# Patient Record
Sex: MALE | Race: WHITE | NOT HISPANIC OR LATINO | Employment: FULL TIME | URBAN - METROPOLITAN AREA
[De-identification: names, ages, dates, MRNs, and addresses within clinical notes are randomized per-mention and may not be internally consistent; named-entity substitution may affect disease eponyms.]

---

## 2023-04-07 LAB
ALANINE AMINOTRANSFERASE (SGPT) (U/L) IN SER/PLAS: 15 U/L (ref 10–52)
ALBUMIN (G/DL) IN SER/PLAS: 3.7 G/DL (ref 3.4–5)
ALKALINE PHOSPHATASE (U/L) IN SER/PLAS: 61 U/L (ref 33–136)
ANION GAP IN SER/PLAS: 13 MMOL/L (ref 10–20)
ASPARTATE AMINOTRANSFERASE (SGOT) (U/L) IN SER/PLAS: 17 U/L (ref 9–39)
BASOPHILS (10*3/UL) IN BLOOD BY AUTOMATED COUNT: 0.03 X10E9/L (ref 0–0.1)
BASOPHILS/100 LEUKOCYTES IN BLOOD BY AUTOMATED COUNT: 0.4 % (ref 0–2)
BILIRUBIN TOTAL (MG/DL) IN SER/PLAS: 0.4 MG/DL (ref 0–1.2)
C REACTIVE PROTEIN (MG/L) IN SER/PLAS: 0.14 MG/DL
CALCIUM (MG/DL) IN SER/PLAS: 9 MG/DL (ref 8.6–10.6)
CARBON DIOXIDE, TOTAL (MMOL/L) IN SER/PLAS: 26 MMOL/L (ref 21–32)
CHLORIDE (MMOL/L) IN SER/PLAS: 107 MMOL/L (ref 98–107)
CREATININE (MG/DL) IN SER/PLAS: 0.86 MG/DL (ref 0.5–1.3)
EOSINOPHILS (10*3/UL) IN BLOOD BY AUTOMATED COUNT: 0.25 X10E9/L (ref 0–0.7)
EOSINOPHILS/100 LEUKOCYTES IN BLOOD BY AUTOMATED COUNT: 3.3 % (ref 0–6)
ERYTHROCYTE DISTRIBUTION WIDTH (RATIO) BY AUTOMATED COUNT: 13.8 % (ref 11.5–14.5)
ERYTHROCYTE MEAN CORPUSCULAR HEMOGLOBIN CONCENTRATION (G/DL) BY AUTOMATED: 33.2 G/DL (ref 32–36)
ERYTHROCYTE MEAN CORPUSCULAR VOLUME (FL) BY AUTOMATED COUNT: 95 FL (ref 80–100)
ERYTHROCYTES (10*6/UL) IN BLOOD BY AUTOMATED COUNT: 4.18 X10E12/L (ref 4.5–5.9)
GFR MALE: >90 ML/MIN/1.73M2
GLUCOSE (MG/DL) IN SER/PLAS: 84 MG/DL (ref 74–99)
HEMATOCRIT (%) IN BLOOD BY AUTOMATED COUNT: 39.7 % (ref 41–52)
HEMOGLOBIN (G/DL) IN BLOOD: 13.2 G/DL (ref 13.5–17.5)
IMMATURE GRANULOCYTES/100 LEUKOCYTES IN BLOOD BY AUTOMATED COUNT: 0.3 % (ref 0–0.9)
LEUKOCYTES (10*3/UL) IN BLOOD BY AUTOMATED COUNT: 7.5 X10E9/L (ref 4.4–11.3)
LYMPHOCYTES (10*3/UL) IN BLOOD BY AUTOMATED COUNT: 1.56 X10E9/L (ref 1.2–4.8)
LYMPHOCYTES/100 LEUKOCYTES IN BLOOD BY AUTOMATED COUNT: 20.9 % (ref 13–44)
MONOCYTES (10*3/UL) IN BLOOD BY AUTOMATED COUNT: 0.22 X10E9/L (ref 0.1–1)
MONOCYTES/100 LEUKOCYTES IN BLOOD BY AUTOMATED COUNT: 2.9 % (ref 2–10)
NEUTROPHILS (10*3/UL) IN BLOOD BY AUTOMATED COUNT: 5.4 X10E9/L (ref 1.2–7.7)
NEUTROPHILS/100 LEUKOCYTES IN BLOOD BY AUTOMATED COUNT: 72.2 % (ref 40–80)
NRBC (PER 100 WBCS) BY AUTOMATED COUNT: 0 /100 WBC (ref 0–0)
PLATELETS (10*3/UL) IN BLOOD AUTOMATED COUNT: 290 X10E9/L (ref 150–450)
POTASSIUM (MMOL/L) IN SER/PLAS: 4.6 MMOL/L (ref 3.5–5.3)
PROTEIN TOTAL: 6.1 G/DL (ref 6.4–8.2)
SEDIMENTATION RATE, ERYTHROCYTE: 5 MM/H (ref 0–20)
SODIUM (MMOL/L) IN SER/PLAS: 141 MMOL/L (ref 136–145)
UREA NITROGEN (MG/DL) IN SER/PLAS: 17 MG/DL (ref 6–23)

## 2023-12-06 PROBLEM — M25.559 JOINT PAIN, HIP: Status: ACTIVE | Noted: 2023-12-06

## 2023-12-06 PROBLEM — M54.10 RADICULOPATHY: Status: ACTIVE | Noted: 2023-12-06

## 2023-12-06 PROBLEM — I34.1 MITRAL VALVE PROLAPSE SYNDROME: Status: ACTIVE | Noted: 2023-12-06

## 2023-12-06 PROBLEM — I49.3 PREMATURE VENTRICULAR CONTRACTIONS: Status: ACTIVE | Noted: 2023-12-06

## 2023-12-06 PROBLEM — K58.9 IBS (IRRITABLE BOWEL SYNDROME): Status: ACTIVE | Noted: 2023-12-06

## 2023-12-06 PROBLEM — M54.59 MECHANICAL LOW BACK PAIN: Status: ACTIVE | Noted: 2023-12-06

## 2023-12-06 PROBLEM — M05.9 SEROPOSITIVE RHEUMATOID ARTHRITIS (MULTI): Status: ACTIVE | Noted: 2023-12-06

## 2023-12-06 PROBLEM — I73.00 RAYNAUDS PHENOMENON: Status: ACTIVE | Noted: 2023-12-06

## 2023-12-06 PROBLEM — M89.8X1 PAIN OF RIGHT SCAPULA: Status: ACTIVE | Noted: 2023-12-06

## 2023-12-06 PROBLEM — M19.049 ARTHROPATHY OF HAND: Status: ACTIVE | Noted: 2023-12-06

## 2023-12-06 PROBLEM — M75.21 BICEPS TENDINITIS OF RIGHT UPPER EXTREMITY: Status: ACTIVE | Noted: 2023-12-06

## 2023-12-06 PROBLEM — E55.9 VITAMIN D DEFICIENCY: Status: ACTIVE | Noted: 2023-12-06

## 2023-12-06 RX ORDER — DESOXIMETASONE 2.5 MG/G
CREAM TOPICAL
COMMUNITY
Start: 2015-09-23

## 2023-12-06 RX ORDER — SILDENAFIL 50 MG/1
TABLET, FILM COATED ORAL
COMMUNITY
Start: 2013-09-03

## 2023-12-06 RX ORDER — CYCLOBENZAPRINE HCL 10 MG
1 TABLET ORAL 3 TIMES DAILY PRN
COMMUNITY
Start: 2019-06-18

## 2023-12-06 RX ORDER — LEVOTHYROXINE SODIUM 25 UG/1
1 TABLET ORAL DAILY
COMMUNITY
Start: 2021-09-21

## 2023-12-06 RX ORDER — ACYCLOVIR 200 MG/1
200 CAPSULE ORAL EVERY 12 HOURS
COMMUNITY
Start: 2013-09-03

## 2023-12-06 RX ORDER — FOLIC ACID 1 MG/1
1 TABLET ORAL DAILY
COMMUNITY
Start: 2013-09-03

## 2023-12-06 RX ORDER — ACETAMINOPHEN 500 MG
500 TABLET ORAL EVERY 4 HOURS PRN
COMMUNITY
Start: 2019-06-10

## 2023-12-06 RX ORDER — LIDOCAINE 50 MG/G
PATCH TOPICAL
COMMUNITY
Start: 2015-12-02

## 2023-12-06 RX ORDER — HYDROXYCHLOROQUINE SULFATE 200 MG/1
200 TABLET, FILM COATED ORAL 2 TIMES DAILY
COMMUNITY
Start: 2013-09-03 | End: 2023-12-07 | Stop reason: SDUPTHER

## 2023-12-06 RX ORDER — TAMSULOSIN HYDROCHLORIDE 0.4 MG/1
CAPSULE ORAL
COMMUNITY
Start: 2019-10-16

## 2023-12-06 RX ORDER — METHOTREXATE 2.5 MG/1
12.5 TABLET ORAL
COMMUNITY
Start: 2013-09-03 | End: 2023-12-07 | Stop reason: SDUPTHER

## 2023-12-06 RX ORDER — GLUC/MSM/COLGN2/HYAL/ANTIARTH3 375-375-20
1 TABLET ORAL DAILY
COMMUNITY
Start: 2019-05-01

## 2023-12-06 RX ORDER — NAPROXEN 375 MG/1
TABLET ORAL EVERY 12 HOURS
COMMUNITY
Start: 2020-10-01

## 2023-12-07 ENCOUNTER — LAB (OUTPATIENT)
Dept: LAB | Facility: LAB | Age: 73
End: 2023-12-07
Payer: MEDICARE

## 2023-12-07 ENCOUNTER — OFFICE VISIT (OUTPATIENT)
Dept: RHEUMATOLOGY | Facility: CLINIC | Age: 73
End: 2023-12-07
Payer: MEDICARE

## 2023-12-07 VITALS
BODY MASS INDEX: 25.45 KG/M2 | DIASTOLIC BLOOD PRESSURE: 74 MMHG | HEIGHT: 73 IN | HEART RATE: 94 BPM | SYSTOLIC BLOOD PRESSURE: 139 MMHG | WEIGHT: 192 LBS

## 2023-12-07 DIAGNOSIS — M05.9 SEROPOSITIVE RHEUMATOID ARTHRITIS (MULTI): ICD-10-CM

## 2023-12-07 DIAGNOSIS — M05.9 SEROPOSITIVE RHEUMATOID ARTHRITIS (MULTI): Primary | ICD-10-CM

## 2023-12-07 LAB
ALBUMIN SERPL BCP-MCNC: 4.2 G/DL (ref 3.4–5)
ALP SERPL-CCNC: 73 U/L (ref 33–136)
ALT SERPL W P-5'-P-CCNC: 21 U/L (ref 10–52)
ANION GAP SERPL CALC-SCNC: 13 MMOL/L (ref 10–20)
AST SERPL W P-5'-P-CCNC: 20 U/L (ref 9–39)
BASOPHILS # BLD AUTO: 0.01 X10*3/UL (ref 0–0.1)
BASOPHILS NFR BLD AUTO: 0.1 %
BILIRUB SERPL-MCNC: 0.5 MG/DL (ref 0–1.2)
BUN SERPL-MCNC: 20 MG/DL (ref 6–23)
CALCIUM SERPL-MCNC: 9.2 MG/DL (ref 8.6–10.6)
CHLORIDE SERPL-SCNC: 106 MMOL/L (ref 98–107)
CO2 SERPL-SCNC: 27 MMOL/L (ref 21–32)
CREAT SERPL-MCNC: 1.04 MG/DL (ref 0.5–1.3)
CRP SERPL-MCNC: 0.22 MG/DL
EOSINOPHIL # BLD AUTO: 0.31 X10*3/UL (ref 0–0.4)
EOSINOPHIL NFR BLD AUTO: 3 %
ERYTHROCYTE [DISTWIDTH] IN BLOOD BY AUTOMATED COUNT: 13.2 % (ref 11.5–14.5)
ERYTHROCYTE [SEDIMENTATION RATE] IN BLOOD BY WESTERGREN METHOD: 4 MM/H (ref 0–20)
GFR SERPL CREATININE-BSD FRML MDRD: 77 ML/MIN/1.73M*2
GLUCOSE SERPL-MCNC: 96 MG/DL (ref 74–99)
HCT VFR BLD AUTO: 46.3 % (ref 41–52)
HGB BLD-MCNC: 15.4 G/DL (ref 13.5–17.5)
IMM GRANULOCYTES # BLD AUTO: 0.04 X10*3/UL (ref 0–0.5)
IMM GRANULOCYTES NFR BLD AUTO: 0.4 % (ref 0–0.9)
LYMPHOCYTES # BLD AUTO: 1.55 X10*3/UL (ref 0.8–3)
LYMPHOCYTES NFR BLD AUTO: 15.2 %
MCH RBC QN AUTO: 31.4 PG (ref 26–34)
MCHC RBC AUTO-ENTMCNC: 33.3 G/DL (ref 32–36)
MCV RBC AUTO: 95 FL (ref 80–100)
MONOCYTES # BLD AUTO: 0.26 X10*3/UL (ref 0.05–0.8)
MONOCYTES NFR BLD AUTO: 2.6 %
NEUTROPHILS # BLD AUTO: 8.01 X10*3/UL (ref 1.6–5.5)
NEUTROPHILS NFR BLD AUTO: 78.7 %
NRBC BLD-RTO: 0 /100 WBCS (ref 0–0)
PLATELET # BLD AUTO: 301 X10*3/UL (ref 150–450)
POTASSIUM SERPL-SCNC: 4.6 MMOL/L (ref 3.5–5.3)
PROT SERPL-MCNC: 6.9 G/DL (ref 6.4–8.2)
RBC # BLD AUTO: 4.9 X10*6/UL (ref 4.5–5.9)
SODIUM SERPL-SCNC: 141 MMOL/L (ref 136–145)
WBC # BLD AUTO: 10.2 X10*3/UL (ref 4.4–11.3)

## 2023-12-07 PROCEDURE — 80053 COMPREHEN METABOLIC PANEL: CPT

## 2023-12-07 PROCEDURE — 36415 COLL VENOUS BLD VENIPUNCTURE: CPT

## 2023-12-07 PROCEDURE — 99214 OFFICE O/P EST MOD 30 MIN: CPT | Performed by: INTERNAL MEDICINE

## 2023-12-07 PROCEDURE — 86140 C-REACTIVE PROTEIN: CPT

## 2023-12-07 PROCEDURE — 85025 COMPLETE CBC W/AUTO DIFF WBC: CPT

## 2023-12-07 PROCEDURE — 85652 RBC SED RATE AUTOMATED: CPT

## 2023-12-07 PROCEDURE — 1159F MED LIST DOCD IN RCRD: CPT | Performed by: INTERNAL MEDICINE

## 2023-12-07 RX ORDER — KETOCONAZOLE 20 MG/G
CREAM TOPICAL
COMMUNITY
Start: 2023-05-14

## 2023-12-07 RX ORDER — TRIAMCINOLONE ACETONIDE 1 MG/G
CREAM TOPICAL
COMMUNITY
Start: 2023-04-20

## 2023-12-07 RX ORDER — IBUPROFEN 600 MG/1
600 TABLET ORAL 2 TIMES DAILY PRN
COMMUNITY

## 2023-12-07 RX ORDER — PREDNISONE 5 MG/1
TABLET ORAL
COMMUNITY
Start: 2023-04-06 | End: 2024-03-21 | Stop reason: ALTCHOICE

## 2023-12-07 RX ORDER — HYDROXYCHLOROQUINE SULFATE 200 MG/1
300 TABLET, FILM COATED ORAL DAILY
Qty: 135 TABLET | Refills: 1 | Status: SHIPPED | OUTPATIENT
Start: 2023-12-07 | End: 2024-05-07

## 2023-12-07 RX ORDER — METHOTREXATE 2.5 MG/1
12.5 TABLET ORAL
Qty: 60 TABLET | Refills: 0 | Status: SHIPPED | OUTPATIENT
Start: 2023-12-07 | End: 2024-03-06

## 2023-12-07 NOTE — PROGRESS NOTES
Follow-up Rheumatology Patient Visit    Chief Complaint:  Charles C Shoenberger is a 73 y.o. male presenting today for Follow-up (refills).    History of Presenting Problem:   74 y/o male with RA present for evaluation. He is currently on MTX 5 pills weekly with folic acid  mg daily He weaned of chronic steroids and is now doing much better mood wise per patient..  Hx of COVID December 2020.   Today he report no acute joint complaints. He denies any new issues with his Lungs. He has been very active with no limitations in his joints     Problem List:   Patient Active Problem List   Diagnosis    Arthropathy of hand    Vitamin D deficiency    Seropositive rheumatoid arthritis (CMS/HCC)    Raynauds phenomenon    Radiculopathy    Premature ventricular contractions    Pain of right scapula    Mitral valve prolapse syndrome    Mechanical low back pain    Joint pain, hip    IBS (irritable bowel syndrome)    Biceps tendinitis of right upper extremity       Past Medical History:   Past Medical History:   Diagnosis Date    Bicipital tendinitis, right shoulder 05/26/2020    Biceps tendinitis of right upper extremity    Bunion of right foot 10/04/2016    Bunion of great toe of right foot    Calculus of kidney     Calcium kidney stones    Personal history of other diseases of the circulatory system 05/01/2019    History of cardiomyopathy    Personal history of other diseases of the musculoskeletal system and connective tissue 03/04/2014    History of joint pain    Personal history of other diseases of the nervous system and sense organs 12/02/2015    History of carpal tunnel syndrome    Personal history of other diseases of the respiratory system 03/17/2017    History of acute bronchitis    Personal history of other diseases of the respiratory system 02/19/2020    History of acute sinusitis    Personal history of other diseases of the respiratory system 11/03/2015    History of acute sinusitis    Personal history of other  diseases of the respiratory system 11/03/2015    History of acute bronchitis    Pneumonia, unspecified organism 03/27/2019    Community acquired pneumonia    Radiculopathy, cervical region 03/03/2020    Cervical radiculopathy    Right lower quadrant pain 03/13/2014    Abdominal pain, RLQ (right lower quadrant)    Sensorineural hearing loss, bilateral 02/07/2017    Bilateral sensorineural hearing loss    Strain of muscle, fascia and tendon of lower back, initial encounter 06/18/2019    Back strain, initial encounter       Surgical History:   Past Surgical History:   Procedure Laterality Date    APPENDECTOMY  09/03/2013    Appendectomy    COLONOSCOPY  02/07/2017    Complete Colonoscopy    HERNIA REPAIR  09/03/2013    Inguinal Hernia Repair        Allergies:   Allergies   Allergen Reactions    Lisinopril Cough       Medications:   Current Outpatient Medications:     acetaminophen (Tylenol Extra Strength) 500 mg tablet, Take 1 tablet (500 mg) by mouth every 4 hours if needed., Disp: , Rfl:     acyclovir (Zovirax) 200 mg capsule, Take 1 capsule (200 mg) by mouth every 12 hours., Disp: , Rfl:     cyclobenzaprine (Flexeril) 10 mg tablet, Take 1 tablet (10 mg) by mouth 3 times a day as needed., Disp: , Rfl:     desoximetasone (Topicort) 0.25 % cream, APPLY TWICE DAILY AS NEEDED TO AFFECTED AREA, Disp: , Rfl:     diclofenac sodium 1 % kit, Place 2 g on the skin 3 times a day., Disp: , Rfl:     folic acid (Folvite) 1 mg tablet, Take 1 tablet (1 mg) by mouth once daily., Disp: , Rfl:     ibuprofen 600 mg tablet, Take 1 tablet (600 mg) by mouth 2 times a day as needed., Disp: , Rfl:     ketoconazole (NIZOral) 2 % cream, , Disp: , Rfl:     levothyroxine (Synthroid, Levoxyl) 25 mcg tablet, Take 1 tablet (25 mcg) by mouth once daily., Disp: , Rfl:     lidocaine (Lidoderm) 5 % patch, USE AS DIRECTED ON PACKAGE, Disp: , Rfl:     mv-min-folic-lycop-lut-herb178 (Nate Multivitamin For Men) 200-175-250 mcg tablet, Take 1 tablet by mouth  "once daily., Disp: , Rfl:     naproxen (Naprosyn) 375 mg tablet, Take by mouth every 12 hours., Disp: , Rfl:     predniSONE (Deltasone) 5 mg tablet, , Disp: , Rfl:     sildenafil (Viagra) 50 mg tablet, Take by mouth., Disp: , Rfl:     tamsulosin (Flomax) 0.4 mg 24 hr capsule, Take by mouth once daily., Disp: , Rfl:     triamcinolone (Kenalog) 0.1 % cream, APPLY TOPICALLY TO AFFECTED AREAS ON FINGER AND LEFT WRIST ONCE DAILY AND COVER WITH PAPER TAPE, Disp: , Rfl:     hydroxychloroquine (Plaquenil) 200 mg tablet, Take 1.5 tablets (300 mg) by mouth once daily., Disp: 135 tablet, Rfl: 1    methotrexate (Trexall) 2.5 mg tablet, Take 5 tablets (12.5 mg total) by mouth 1 (one) time per week., Disp: 60 tablet, Rfl: 0      Objective   Physical Examination:    Visit Vitals  /74   Pulse 94   Ht 1.854 m (6' 1\")   Wt 87.1 kg (192 lb)   BMI 25.33 kg/m²   BSA 2.12 m²        Gen: NAD, A&O x 3  HEENT: clear sclera and conjunctiva,     Musculoskeletal:   Neck; WNL, full ROM  Shoulder: WNL, full ROM  Elbow:WNL, full ROM, no effusion noted  Wrist and fingers;no active synovitis noted, Full ROM in the Wrist , Good fist and   Knees:  No effusions or crepitation, full ROM.  Hips; WNL, full ROM, Negative Jae test  Ankle, Feet; WNL, full ROM    Skin: No rashes or lesions seen, no nail changes  Neuro: A&O x3, Normal Gait    Procedures :None    Orders:  Orders Placed This Encounter   Procedures    CBC and Auto Differential    Comprehensive Metabolic Panel    C-Reactive Protein    Sedimentation Rate        Provider Impression:   Assessment/Plan   Encounter Diagnosis   Name Primary?    Seropositive rheumatoid arthritis (CMS/HCC) Yes          72 y/o male with Seronegative RA present for evaluation.  -Continue methotrexate to 5 pills weekly, with daily folic acid   -Continue Plaquenil 200 mg BID, follow-up with ophthalmology for yearly eye screening  -Can use diclofenac gel for his fingers as needed  -We will obtain routine labs, " he did not do last orders  - F/u in 4 months

## 2024-03-21 ENCOUNTER — OFFICE VISIT (OUTPATIENT)
Dept: RHEUMATOLOGY | Facility: CLINIC | Age: 74
End: 2024-03-21
Payer: MEDICARE

## 2024-03-21 VITALS
HEART RATE: 80 BPM | HEIGHT: 73 IN | SYSTOLIC BLOOD PRESSURE: 124 MMHG | DIASTOLIC BLOOD PRESSURE: 83 MMHG | WEIGHT: 200.1 LBS | BODY MASS INDEX: 26.52 KG/M2

## 2024-03-21 DIAGNOSIS — M05.9 SEROPOSITIVE RHEUMATOID ARTHRITIS (MULTI): Primary | ICD-10-CM

## 2024-03-21 DIAGNOSIS — E55.9 VITAMIN D DEFICIENCY: ICD-10-CM

## 2024-03-21 PROCEDURE — 1159F MED LIST DOCD IN RCRD: CPT | Performed by: INTERNAL MEDICINE

## 2024-03-21 PROCEDURE — 99214 OFFICE O/P EST MOD 30 MIN: CPT | Performed by: INTERNAL MEDICINE

## 2024-03-21 RX ORDER — PREDNISONE 5 MG/1
TABLET ORAL
Qty: 30 TABLET | Refills: 0 | Status: SHIPPED | OUTPATIENT
Start: 2024-03-21 | End: 2024-04-02

## 2024-03-21 NOTE — PROGRESS NOTES
Follow-up Rheumatology Patient Visit    Chief Complaint:  Charles C Shoenberger is a 73 y.o. male presenting today for Follow-up.    History of Presenting Problem:   72 y/o male with RA present for evaluation. He is currently on MTX 5 pills weekly with folic acid  mg daily He weaned of chronic steroids and is now doing much better mood wise per patient..  Hx of COVID December 2020.   Today he report generalized joints worse in his knee and hands. He report it started in December 2023. It has been going up and down, he report prolonged morning stiffness, trouble sleep at bedtime     Problem List:   Patient Active Problem List   Diagnosis    Arthropathy of hand    Vitamin D deficiency    Seropositive rheumatoid arthritis (CMS/HCC)    Raynauds phenomenon    Radiculopathy    Premature ventricular contractions    Pain of right scapula    Mitral valve prolapse syndrome    Mechanical low back pain    Joint pain, hip    IBS (irritable bowel syndrome)    Biceps tendinitis of right upper extremity       Past Medical History:   Past Medical History:   Diagnosis Date    Bicipital tendinitis, right shoulder 05/26/2020    Biceps tendinitis of right upper extremity    Bunion of right foot 10/04/2016    Bunion of great toe of right foot    Calculus of kidney     Calcium kidney stones    Personal history of other diseases of the circulatory system 05/01/2019    History of cardiomyopathy    Personal history of other diseases of the musculoskeletal system and connective tissue 03/04/2014    History of joint pain    Personal history of other diseases of the nervous system and sense organs 12/02/2015    History of carpal tunnel syndrome    Personal history of other diseases of the respiratory system 03/17/2017    History of acute bronchitis    Personal history of other diseases of the respiratory system 02/19/2020    History of acute sinusitis    Personal history of other diseases of the respiratory system 11/03/2015    History  of acute sinusitis    Personal history of other diseases of the respiratory system 11/03/2015    History of acute bronchitis    Pneumonia, unspecified organism 03/27/2019    Community acquired pneumonia    Radiculopathy, cervical region 03/03/2020    Cervical radiculopathy    Right lower quadrant pain 03/13/2014    Abdominal pain, RLQ (right lower quadrant)    Sensorineural hearing loss, bilateral 02/07/2017    Bilateral sensorineural hearing loss    Strain of muscle, fascia and tendon of lower back, initial encounter 06/18/2019    Back strain, initial encounter       Surgical History:   Past Surgical History:   Procedure Laterality Date    APPENDECTOMY  09/03/2013    Appendectomy    COLONOSCOPY  02/07/2017    Complete Colonoscopy    HERNIA REPAIR  09/03/2013    Inguinal Hernia Repair        Allergies:   Allergies   Allergen Reactions    Lisinopril Cough       Medications:   Current Outpatient Medications:     acetaminophen (Tylenol Extra Strength) 500 mg tablet, Take 1 tablet (500 mg) by mouth every 4 hours if needed., Disp: , Rfl:     acyclovir (Zovirax) 200 mg capsule, Take 1 capsule (200 mg) by mouth every 12 hours., Disp: , Rfl:     cyclobenzaprine (Flexeril) 10 mg tablet, Take 1 tablet (10 mg) by mouth 3 times a day as needed., Disp: , Rfl:     desoximetasone (Topicort) 0.25 % cream, APPLY TWICE DAILY AS NEEDED TO AFFECTED AREA, Disp: , Rfl:     diclofenac sodium 1 % kit, Place 2 g on the skin 3 times a day., Disp: , Rfl:     folic acid (Folvite) 1 mg tablet, Take 1 tablet (1 mg) by mouth once daily., Disp: , Rfl:     ibuprofen 600 mg tablet, Take 1 tablet (600 mg) by mouth 2 times a day as needed., Disp: , Rfl:     ketoconazole (NIZOral) 2 % cream, , Disp: , Rfl:     levothyroxine (Synthroid, Levoxyl) 25 mcg tablet, Take 1 tablet (25 mcg) by mouth once daily., Disp: , Rfl:     lidocaine (Lidoderm) 5 % patch, USE AS DIRECTED ON PACKAGE, Disp: , Rfl:     mv-min-folic-lycop-lut-herb178 (Nate Multivitamin For  "Men) 200-175-250 mcg tablet, Take 1 tablet by mouth once daily., Disp: , Rfl:     naproxen (Naprosyn) 375 mg tablet, Take by mouth every 12 hours., Disp: , Rfl:     sildenafil (Viagra) 50 mg tablet, Take by mouth., Disp: , Rfl:     tamsulosin (Flomax) 0.4 mg 24 hr capsule, Take by mouth once daily., Disp: , Rfl:     triamcinolone (Kenalog) 0.1 % cream, APPLY TOPICALLY TO AFFECTED AREAS ON FINGER AND LEFT WRIST ONCE DAILY AND COVER WITH PAPER TAPE, Disp: , Rfl:       Objective   Physical Examination:    Visit Vitals  /83   Pulse 80   Ht 1.854 m (6' 0.99\")   Wt 90.8 kg (200 lb 1.6 oz)   BMI 26.41 kg/m²   BSA 2.16 m²        Gen: NAD, A&O x 3  HEENT: clear sclera and conjunctiva,     Musculoskeletal:   Neck; WNL, full ROM  Shoulder: WNL, full ROM  Elbow:WNL, full ROM, no effusion noted  Wrist and fingers;no active synovitis noted, Full ROM in the Wrist , Good fist and   Knees:  No effusions or crepitation, full ROM.  Hips; WNL, full ROM, Negative Jae test  Ankle, Feet; WNL, full ROM    Skin: No rashes or lesions seen, no nail changes  Neuro: A&O x3, Normal Gait    Procedures :None    Orders:  No orders of the defined types were placed in this encounter.       Provider Impression:   Assessment/Plan   Encounter Diagnosis   Name Primary?    Seropositive rheumatoid arthritis (CMS/HCC) Yes      74 y/o male with Seropositive RA present for evaluation.  -Start prednisone Taper  -Continue methotrexate to 5 pills weekly for now may adjust as needed, with daily folic acid   -Continue Plaquenil 200 mg BID, follow-up with ophthalmology for yearly eye screening  -Can use diclofenac gel for his fingers as needed  -We will obtain routine labs  - F/u in 2-3 months   "

## 2024-03-22 ENCOUNTER — LAB (OUTPATIENT)
Dept: LAB | Facility: LAB | Age: 74
End: 2024-03-22
Payer: MEDICARE

## 2024-03-22 DIAGNOSIS — E55.9 VITAMIN D DEFICIENCY: ICD-10-CM

## 2024-03-22 DIAGNOSIS — M05.9 SEROPOSITIVE RHEUMATOID ARTHRITIS (MULTI): ICD-10-CM

## 2024-03-22 LAB
25(OH)D3 SERPL-MCNC: 38 NG/ML (ref 30–100)
ALBUMIN SERPL BCP-MCNC: 4 G/DL (ref 3.4–5)
ALP SERPL-CCNC: 75 U/L (ref 33–136)
ALT SERPL W P-5'-P-CCNC: 22 U/L (ref 10–52)
ANION GAP SERPL CALC-SCNC: 13 MMOL/L (ref 10–20)
AST SERPL W P-5'-P-CCNC: 18 U/L (ref 9–39)
BASOPHILS # BLD AUTO: 0.03 X10*3/UL (ref 0–0.1)
BASOPHILS NFR BLD AUTO: 0.4 %
BILIRUB SERPL-MCNC: 0.5 MG/DL (ref 0–1.2)
BUN SERPL-MCNC: 19 MG/DL (ref 6–23)
CALCIUM SERPL-MCNC: 9.3 MG/DL (ref 8.6–10.6)
CHLORIDE SERPL-SCNC: 106 MMOL/L (ref 98–107)
CK SERPL-CCNC: 54 U/L (ref 0–325)
CO2 SERPL-SCNC: 28 MMOL/L (ref 21–32)
CREAT SERPL-MCNC: 0.97 MG/DL (ref 0.5–1.3)
CRP SERPL-MCNC: 0.33 MG/DL
EGFRCR SERPLBLD CKD-EPI 2021: 83 ML/MIN/1.73M*2
EOSINOPHIL # BLD AUTO: 0.38 X10*3/UL (ref 0–0.4)
EOSINOPHIL NFR BLD AUTO: 4.9 %
ERYTHROCYTE [DISTWIDTH] IN BLOOD BY AUTOMATED COUNT: 13.8 % (ref 11.5–14.5)
ERYTHROCYTE [SEDIMENTATION RATE] IN BLOOD BY WESTERGREN METHOD: 11 MM/H (ref 0–20)
GLUCOSE SERPL-MCNC: 114 MG/DL (ref 74–99)
HCT VFR BLD AUTO: 47.2 % (ref 41–52)
HGB BLD-MCNC: 14.9 G/DL (ref 13.5–17.5)
IMM GRANULOCYTES # BLD AUTO: 0.04 X10*3/UL (ref 0–0.5)
IMM GRANULOCYTES NFR BLD AUTO: 0.5 % (ref 0–0.9)
LYMPHOCYTES # BLD AUTO: 2.01 X10*3/UL (ref 0.8–3)
LYMPHOCYTES NFR BLD AUTO: 26.1 %
MCH RBC QN AUTO: 31.1 PG (ref 26–34)
MCHC RBC AUTO-ENTMCNC: 31.6 G/DL (ref 32–36)
MCV RBC AUTO: 99 FL (ref 80–100)
MONOCYTES # BLD AUTO: 0.29 X10*3/UL (ref 0.05–0.8)
MONOCYTES NFR BLD AUTO: 3.8 %
NEUTROPHILS # BLD AUTO: 4.96 X10*3/UL (ref 1.6–5.5)
NEUTROPHILS NFR BLD AUTO: 64.3 %
NRBC BLD-RTO: 0 /100 WBCS (ref 0–0)
PLATELET # BLD AUTO: 320 X10*3/UL (ref 150–450)
POTASSIUM SERPL-SCNC: 5.4 MMOL/L (ref 3.5–5.3)
PROT SERPL-MCNC: 6.6 G/DL (ref 6.4–8.2)
RBC # BLD AUTO: 4.79 X10*6/UL (ref 4.5–5.9)
SODIUM SERPL-SCNC: 142 MMOL/L (ref 136–145)
WBC # BLD AUTO: 7.7 X10*3/UL (ref 4.4–11.3)

## 2024-03-22 PROCEDURE — 85025 COMPLETE CBC W/AUTO DIFF WBC: CPT

## 2024-03-22 PROCEDURE — 82550 ASSAY OF CK (CPK): CPT

## 2024-03-22 PROCEDURE — 82306 VITAMIN D 25 HYDROXY: CPT

## 2024-03-22 PROCEDURE — 80053 COMPREHEN METABOLIC PANEL: CPT

## 2024-03-22 PROCEDURE — 36415 COLL VENOUS BLD VENIPUNCTURE: CPT

## 2024-03-22 PROCEDURE — 85652 RBC SED RATE AUTOMATED: CPT

## 2024-03-22 PROCEDURE — 86140 C-REACTIVE PROTEIN: CPT

## 2024-04-18 ENCOUNTER — APPOINTMENT (OUTPATIENT)
Dept: RHEUMATOLOGY | Facility: CLINIC | Age: 74
End: 2024-04-18
Payer: MEDICARE

## 2024-05-07 DIAGNOSIS — M05.9 SEROPOSITIVE RHEUMATOID ARTHRITIS (MULTI): ICD-10-CM

## 2024-05-07 RX ORDER — HYDROXYCHLOROQUINE SULFATE 200 MG/1
TABLET, FILM COATED ORAL DAILY
Qty: 135 TABLET | Refills: 0 | Status: SHIPPED | OUTPATIENT
Start: 2024-05-07

## 2024-05-07 RX ORDER — METHOTREXATE 2.5 MG/1
TABLET ORAL
Qty: 112 TABLET | Refills: 0 | Status: SHIPPED | OUTPATIENT
Start: 2024-05-07

## 2024-06-20 ENCOUNTER — APPOINTMENT (OUTPATIENT)
Dept: RHEUMATOLOGY | Facility: CLINIC | Age: 74
End: 2024-06-20
Payer: MEDICARE

## 2024-06-20 VITALS
HEIGHT: 72 IN | SYSTOLIC BLOOD PRESSURE: 135 MMHG | WEIGHT: 192 LBS | BODY MASS INDEX: 26.01 KG/M2 | DIASTOLIC BLOOD PRESSURE: 89 MMHG

## 2024-06-20 DIAGNOSIS — M05.9 SEROPOSITIVE RHEUMATOID ARTHRITIS (MULTI): Primary | ICD-10-CM

## 2024-06-20 PROCEDURE — 1036F TOBACCO NON-USER: CPT | Performed by: INTERNAL MEDICINE

## 2024-06-20 PROCEDURE — 1160F RVW MEDS BY RX/DR IN RCRD: CPT | Performed by: INTERNAL MEDICINE

## 2024-06-20 PROCEDURE — 99214 OFFICE O/P EST MOD 30 MIN: CPT | Performed by: INTERNAL MEDICINE

## 2024-06-20 PROCEDURE — 1159F MED LIST DOCD IN RCRD: CPT | Performed by: INTERNAL MEDICINE

## 2024-06-20 RX ORDER — METHOTREXATE 2.5 MG/1
15 TABLET ORAL
Qty: 112 TABLET | Refills: 0 | Status: SHIPPED | OUTPATIENT
Start: 2024-06-23

## 2024-06-20 RX ORDER — CELECOXIB 200 MG/1
200 CAPSULE ORAL DAILY PRN
Qty: 30 CAPSULE | Refills: 0 | Status: SHIPPED | OUTPATIENT
Start: 2024-06-20 | End: 2024-07-20

## 2024-06-20 RX ORDER — HYDROXYCHLOROQUINE SULFATE 200 MG/1
300 TABLET, FILM COATED ORAL DAILY
Qty: 135 TABLET | Refills: 0 | Status: SHIPPED | OUTPATIENT
Start: 2024-06-20

## 2024-06-20 NOTE — PROGRESS NOTES
Follow-up Rheumatology Patient Visit    Chief Complaint:  Charles C Shoenberger is a 73 y.o. male presenting today for Follow-up.    History of Presenting Problem:   72 y/o male with RA present for evaluation. He is currently on MTX 5 pills weekly with folic acid  mg daily He weaned of chronic steroids and is now doing much better mood wise per patient..  Hx of COVID December 2020.   Today he he has good days and bad days and is arthritis sometimes he has occasional joint stiffness in his fingers, DIP and MCP joints.  But otherwise he is able to do most of his activities and is currently swimming.  She takes ibuprofen as needed    Problem List:   Patient Active Problem List   Diagnosis    Arthropathy of hand    Vitamin D deficiency    Seropositive rheumatoid arthritis (Multi)    Raynauds phenomenon    Radiculopathy    Premature ventricular contractions    Pain of right scapula    Mitral valve prolapse syndrome    Mechanical low back pain    Joint pain, hip    IBS (irritable bowel syndrome)    Biceps tendinitis of right upper extremity       Past Medical History:   Past Medical History:   Diagnosis Date    Bicipital tendinitis, right shoulder 05/26/2020    Biceps tendinitis of right upper extremity    Bunion of right foot 10/04/2016    Bunion of great toe of right foot    Calculus of kidney     Calcium kidney stones    Personal history of other diseases of the circulatory system 05/01/2019    History of cardiomyopathy    Personal history of other diseases of the musculoskeletal system and connective tissue 03/04/2014    History of joint pain    Personal history of other diseases of the nervous system and sense organs 12/02/2015    History of carpal tunnel syndrome    Personal history of other diseases of the respiratory system 03/17/2017    History of acute bronchitis    Personal history of other diseases of the respiratory system 02/19/2020    History of acute sinusitis    Personal history of other diseases  of the respiratory system 11/03/2015    History of acute sinusitis    Personal history of other diseases of the respiratory system 11/03/2015    History of acute bronchitis    Pneumonia, unspecified organism 03/27/2019    Community acquired pneumonia    Radiculopathy, cervical region 03/03/2020    Cervical radiculopathy    Right lower quadrant pain 03/13/2014    Abdominal pain, RLQ (right lower quadrant)    Sensorineural hearing loss, bilateral 02/07/2017    Bilateral sensorineural hearing loss    Strain of muscle, fascia and tendon of lower back, initial encounter 06/18/2019    Back strain, initial encounter       Surgical History:   Past Surgical History:   Procedure Laterality Date    APPENDECTOMY  09/03/2013    Appendectomy    COLONOSCOPY  02/07/2017    Complete Colonoscopy    HERNIA REPAIR  09/03/2013    Inguinal Hernia Repair        Allergies:   Allergies   Allergen Reactions    Lisinopril Cough       Medications:   Current Outpatient Medications:     acetaminophen (Tylenol Extra Strength) 500 mg tablet, Take 1 tablet (500 mg) by mouth every 4 hours if needed., Disp: , Rfl:     acyclovir (Zovirax) 200 mg capsule, Take 1 capsule (200 mg) by mouth every 12 hours., Disp: , Rfl:     cyclobenzaprine (Flexeril) 10 mg tablet, Take 1 tablet (10 mg) by mouth 3 times a day as needed., Disp: , Rfl:     desoximetasone (Topicort) 0.25 % cream, APPLY TWICE DAILY AS NEEDED TO AFFECTED AREA, Disp: , Rfl:     diclofenac sodium 1 % kit, Place 2 g on the skin 3 times a day., Disp: , Rfl:     folic acid (Folvite) 1 mg tablet, Take 1 tablet (1 mg) by mouth once daily., Disp: , Rfl:     ketoconazole (NIZOral) 2 % cream, , Disp: , Rfl:     levothyroxine (Synthroid, Levoxyl) 25 mcg tablet, Take 1 tablet (25 mcg) by mouth once daily., Disp: , Rfl:     lidocaine (Lidoderm) 5 % patch, USE AS DIRECTED ON PACKAGE, Disp: , Rfl:     mv-min-folic-lycop-lut-herb178 (Nate Multivitamin For Men) 200-175-250 mcg tablet, Take 1 tablet by mouth  once daily., Disp: , Rfl:     sildenafil (Viagra) 50 mg tablet, Take by mouth., Disp: , Rfl:     tamsulosin (Flomax) 0.4 mg 24 hr capsule, Take by mouth once daily., Disp: , Rfl:     triamcinolone (Kenalog) 0.1 % cream, APPLY TOPICALLY TO AFFECTED AREAS ON FINGER AND LEFT WRIST ONCE DAILY AND COVER WITH PAPER TAPE, Disp: , Rfl:     celecoxib (CeleBREX) 200 mg capsule, Take 1 capsule (200 mg) by mouth once daily as needed for moderate pain (4 - 6)., Disp: 30 capsule, Rfl: 0    hydroxychloroquine (Plaquenil) 200 mg tablet, Take 1.5 tablets (300 mg) by mouth once daily., Disp: 135 tablet, Rfl: 0    [START ON 6/23/2024] methotrexate (Trexall) 2.5 mg tablet, Take 6 tablets (15 mg total) by mouth 1 (one) time per week.  Take 3 pills in the am and then 3 pills pm on the same day. Follow directions carefully, and ask to explain any part you do not understand. Take exactly as directed., Disp: 112 tablet, Rfl: 0      Objective   Physical Examination:    Visit Vitals  /89   Ht 1.829 m (6')   Wt 87.1 kg (192 lb)   BMI 26.04 kg/m²   Smoking Status Former   BSA 2.1 m²        Gen: NAD, A&O x 3  HEENT: clear sclera and conjunctiva,     Musculoskeletal:   Neck; WNL, full ROM  Shoulder: WNL, full ROM  Elbow:WNL, full ROM, no effusion noted  Wrist and fingers;no active synovitis noted, Full ROM in the Wrist , Good fist and   Knees:  No effusions or crepitation, full ROM.  Hips; WNL, full ROM, Negative Jae test  Ankle, Feet; WNL, full ROM    Skin: No rashes or lesions seen, no nail changes  Neuro: A&O x3, Normal Gait    Procedures :None    Orders:  Orders Placed This Encounter   Procedures    CBC and Auto Differential    Comprehensive Metabolic Panel    C-Reactive Protein    Sedimentation Rate        Provider Impression:   Assessment/Plan   Encounter Diagnosis   Name Primary?    Seropositive rheumatoid arthritis (Multi) Yes        72 y/o male with Seropositive RA present for evaluation.  -Continue methotrexate, but  increase to 6 pills weekly with daily folic acid   -Continue Plaquenil 200 mg BID, follow-up with ophthalmology for yearly eye screening  -Start Celebrex 200 mg daily as needed.  Will give a short trial  -Can use diclofenac gel for his fingers as needed  -We will obtain routine labs  - F/u in 4 months

## 2024-08-12 ENCOUNTER — LAB (OUTPATIENT)
Dept: LAB | Facility: LAB | Age: 74
End: 2024-08-12
Payer: MEDICARE

## 2024-08-12 ENCOUNTER — APPOINTMENT (OUTPATIENT)
Dept: RHEUMATOLOGY | Facility: CLINIC | Age: 74
End: 2024-08-12
Payer: MEDICARE

## 2024-08-12 VITALS
WEIGHT: 190.2 LBS | RESPIRATION RATE: 18 BRPM | SYSTOLIC BLOOD PRESSURE: 135 MMHG | BODY MASS INDEX: 25.8 KG/M2 | HEART RATE: 93 BPM | TEMPERATURE: 97.4 F | DIASTOLIC BLOOD PRESSURE: 85 MMHG

## 2024-08-12 DIAGNOSIS — M05.9 SEROPOSITIVE RHEUMATOID ARTHRITIS (MULTI): Primary | ICD-10-CM

## 2024-08-12 DIAGNOSIS — M05.9 SEROPOSITIVE RHEUMATOID ARTHRITIS (MULTI): ICD-10-CM

## 2024-08-12 LAB
ALBUMIN SERPL BCP-MCNC: 3.8 G/DL (ref 3.4–5)
ALP SERPL-CCNC: 74 U/L (ref 33–136)
ALT SERPL W P-5'-P-CCNC: 10 U/L (ref 10–52)
ANION GAP SERPL CALC-SCNC: 14 MMOL/L (ref 10–20)
AST SERPL W P-5'-P-CCNC: 14 U/L (ref 9–39)
BASOPHILS # BLD AUTO: 0.04 X10*3/UL (ref 0–0.1)
BASOPHILS NFR BLD AUTO: 0.4 %
BILIRUB SERPL-MCNC: 0.3 MG/DL (ref 0–1.2)
BUN SERPL-MCNC: 25 MG/DL (ref 6–23)
CALCIUM SERPL-MCNC: 9.3 MG/DL (ref 8.6–10.6)
CHLORIDE SERPL-SCNC: 106 MMOL/L (ref 98–107)
CO2 SERPL-SCNC: 27 MMOL/L (ref 21–32)
CREAT SERPL-MCNC: 0.95 MG/DL (ref 0.5–1.3)
CRP SERPL-MCNC: 0.65 MG/DL
EGFRCR SERPLBLD CKD-EPI 2021: 86 ML/MIN/1.73M*2
EOSINOPHIL # BLD AUTO: 0.34 X10*3/UL (ref 0–0.4)
EOSINOPHIL NFR BLD AUTO: 3.5 %
ERYTHROCYTE [DISTWIDTH] IN BLOOD BY AUTOMATED COUNT: 13.8 % (ref 11.5–14.5)
GLUCOSE SERPL-MCNC: 104 MG/DL (ref 74–99)
HCT VFR BLD AUTO: 43.6 % (ref 41–52)
HGB BLD-MCNC: 14.4 G/DL (ref 13.5–17.5)
IMM GRANULOCYTES # BLD AUTO: 0.05 X10*3/UL (ref 0–0.5)
IMM GRANULOCYTES NFR BLD AUTO: 0.5 % (ref 0–0.9)
LYMPHOCYTES # BLD AUTO: 1.56 X10*3/UL (ref 0.8–3)
LYMPHOCYTES NFR BLD AUTO: 15.8 %
MCH RBC QN AUTO: 31 PG (ref 26–34)
MCHC RBC AUTO-ENTMCNC: 33 G/DL (ref 32–36)
MCV RBC AUTO: 94 FL (ref 80–100)
MONOCYTES # BLD AUTO: 0.22 X10*3/UL (ref 0.05–0.8)
MONOCYTES NFR BLD AUTO: 2.2 %
NEUTROPHILS # BLD AUTO: 7.64 X10*3/UL (ref 1.6–5.5)
NEUTROPHILS NFR BLD AUTO: 77.6 %
NRBC BLD-RTO: 0 /100 WBCS (ref 0–0)
PLATELET # BLD AUTO: 370 X10*3/UL (ref 150–450)
POTASSIUM SERPL-SCNC: 4.9 MMOL/L (ref 3.5–5.3)
PROT SERPL-MCNC: 6.7 G/DL (ref 6.4–8.2)
RBC # BLD AUTO: 4.65 X10*6/UL (ref 4.5–5.9)
SODIUM SERPL-SCNC: 142 MMOL/L (ref 136–145)
WBC # BLD AUTO: 9.9 X10*3/UL (ref 4.4–11.3)

## 2024-08-12 PROCEDURE — 99214 OFFICE O/P EST MOD 30 MIN: CPT | Performed by: INTERNAL MEDICINE

## 2024-08-12 PROCEDURE — 80053 COMPREHEN METABOLIC PANEL: CPT

## 2024-08-12 PROCEDURE — 85025 COMPLETE CBC W/AUTO DIFF WBC: CPT

## 2024-08-12 PROCEDURE — 1160F RVW MEDS BY RX/DR IN RCRD: CPT | Performed by: INTERNAL MEDICINE

## 2024-08-12 PROCEDURE — 1036F TOBACCO NON-USER: CPT | Performed by: INTERNAL MEDICINE

## 2024-08-12 PROCEDURE — 36415 COLL VENOUS BLD VENIPUNCTURE: CPT

## 2024-08-12 PROCEDURE — 1159F MED LIST DOCD IN RCRD: CPT | Performed by: INTERNAL MEDICINE

## 2024-08-12 PROCEDURE — 1126F AMNT PAIN NOTED NONE PRSNT: CPT | Performed by: INTERNAL MEDICINE

## 2024-08-12 PROCEDURE — 86140 C-REACTIVE PROTEIN: CPT

## 2024-08-12 PROCEDURE — 85652 RBC SED RATE AUTOMATED: CPT

## 2024-08-12 RX ORDER — CELECOXIB 200 MG/1
200 CAPSULE ORAL DAILY PRN
Qty: 90 CAPSULE | Refills: 0 | Status: SHIPPED | OUTPATIENT
Start: 2024-08-12 | End: 2024-11-10

## 2024-08-12 RX ORDER — PREDNISONE 5 MG/1
TABLET ORAL
Qty: 14 TABLET | Refills: 0 | Status: SHIPPED | OUTPATIENT
Start: 2024-08-12 | End: 2024-08-19

## 2024-08-12 RX ORDER — PREDNISONE 5 MG/1
TABLET ORAL
Qty: 14 TABLET | Refills: 0 | Status: SHIPPED
Start: 2024-08-12 | End: 2024-08-12 | Stop reason: WASHOUT

## 2024-08-12 RX ORDER — HYDROXYCHLOROQUINE SULFATE 200 MG/1
300 TABLET, FILM COATED ORAL DAILY
Qty: 135 TABLET | Refills: 0 | Status: SHIPPED | OUTPATIENT
Start: 2024-08-12

## 2024-08-12 ASSESSMENT — PAIN SCALES - GENERAL: PAINLEVEL: 0-NO PAIN

## 2024-08-12 NOTE — PROGRESS NOTES
Follow-up Rheumatology Patient Visit    Chief Complaint:  Charles C Shoenberger is a 73 y.o. male presenting today for Follow-up.    History of Presenting Problem:   74 y/o male with RA present for evaluation. He is currently on MTX 5 pills weekly with folic acid  mg daily He weaned of chronic steroids and is now doing much better mood wise per patient..  Hx of COVID December 2020.   Today he he has good days and bad days and is arthritis sometimes he has occasional joint stiffness in his fingers, DIP and MCP joints.  But otherwise he is able to do most of his activities and is currently swimming.  He takes ibuprofen as needed  Today he report in the past couple weeks he was having increased morning stiffness, generalized joint pain and swelling in the right hand. He denies fever/chills.He report headaches and loss of appetite.  He also report some element of chronic depression, which he has not taken medication for. He report he also had fatigue as well. He decided to make an appointment and he is now feel better.  He did report with his depression sometimes he lacks motivation to do things outside the house he tries to talk to his friends and that helps  He is also on thyroid medication which is being adjusted.   He recently had a skin biopsy on his upper abdomen, he has not healed well yet.    Problem List:   Patient Active Problem List   Diagnosis    Arthropathy of hand    Vitamin D deficiency    Seropositive rheumatoid arthritis (Multi)    Raynauds phenomenon    Radiculopathy    Premature ventricular contractions    Pain of right scapula    Mitral valve prolapse syndrome    Mechanical low back pain    Joint pain, hip    IBS (irritable bowel syndrome)    Biceps tendinitis of right upper extremity       Past Medical History:   Past Medical History:   Diagnosis Date    Bicipital tendinitis, right shoulder 05/26/2020    Biceps tendinitis of right upper extremity    Bunion of right foot 10/04/2016    Bunion  of great toe of right foot    Calculus of kidney     Calcium kidney stones    Personal history of other diseases of the circulatory system 05/01/2019    History of cardiomyopathy    Personal history of other diseases of the musculoskeletal system and connective tissue 03/04/2014    History of joint pain    Personal history of other diseases of the nervous system and sense organs 12/02/2015    History of carpal tunnel syndrome    Personal history of other diseases of the respiratory system 03/17/2017    History of acute bronchitis    Personal history of other diseases of the respiratory system 02/19/2020    History of acute sinusitis    Personal history of other diseases of the respiratory system 11/03/2015    History of acute sinusitis    Personal history of other diseases of the respiratory system 11/03/2015    History of acute bronchitis    Pneumonia, unspecified organism 03/27/2019    Community acquired pneumonia    Radiculopathy, cervical region 03/03/2020    Cervical radiculopathy    Right lower quadrant pain 03/13/2014    Abdominal pain, RLQ (right lower quadrant)    Sensorineural hearing loss, bilateral 02/07/2017    Bilateral sensorineural hearing loss    Strain of muscle, fascia and tendon of lower back, initial encounter 06/18/2019    Back strain, initial encounter       Surgical History:   Past Surgical History:   Procedure Laterality Date    APPENDECTOMY  09/03/2013    Appendectomy    COLONOSCOPY  02/07/2017    Complete Colonoscopy    HERNIA REPAIR  09/03/2013    Inguinal Hernia Repair        Allergies:   Allergies   Allergen Reactions    Lisinopril Cough       Medications:   Current Outpatient Medications:     acetaminophen (Tylenol Extra Strength) 500 mg tablet, Take 1 tablet (500 mg) by mouth every 4 hours if needed., Disp: , Rfl:     acyclovir (Zovirax) 200 mg capsule, Take 1 capsule (200 mg) by mouth every 12 hours., Disp: , Rfl:     cyclobenzaprine (Flexeril) 10 mg tablet, Take 1 tablet (10 mg) by  mouth 3 times a day as needed., Disp: , Rfl:     desoximetasone (Topicort) 0.25 % cream, APPLY TWICE DAILY AS NEEDED TO AFFECTED AREA, Disp: , Rfl:     diclofenac sodium 1 % kit, Place 2 g on the skin 3 times a day., Disp: , Rfl:     folic acid (Folvite) 1 mg tablet, Take 1 tablet (1 mg) by mouth once daily., Disp: , Rfl:     ketoconazole (NIZOral) 2 % cream, , Disp: , Rfl:     levothyroxine (Synthroid, Levoxyl) 25 mcg tablet, Take 1 tablet (25 mcg) by mouth once daily., Disp: , Rfl:     lidocaine (Lidoderm) 5 % patch, USE AS DIRECTED ON PACKAGE, Disp: , Rfl:     methotrexate (Trexall) 2.5 mg tablet, Take 6 tablets (15 mg total) by mouth 1 (one) time per week.  Take 3 pills in the am and then 3 pills pm on the same day. Follow directions carefully, and ask to explain any part you do not understand. Take exactly as directed., Disp: 112 tablet, Rfl: 0    mv-min-folic-lycop-lut-herb178 (Nate Multivitamin For Men) 200-175-250 mcg tablet, Take 1 tablet by mouth once daily., Disp: , Rfl:     sildenafil (Viagra) 50 mg tablet, Take by mouth., Disp: , Rfl:     tamsulosin (Flomax) 0.4 mg 24 hr capsule, Take by mouth once daily., Disp: , Rfl:     triamcinolone (Kenalog) 0.1 % cream, APPLY TOPICALLY TO AFFECTED AREAS ON FINGER AND LEFT WRIST ONCE DAILY AND COVER WITH PAPER TAPE, Disp: , Rfl:     celecoxib (CeleBREX) 200 mg capsule, Take 1 capsule (200 mg) by mouth once daily as needed for mild pain (1 - 3)., Disp: 90 capsule, Rfl: 0    hydroxychloroquine (Plaquenil) 200 mg tablet, Take 1.5 tablets (300 mg) by mouth once daily., Disp: 135 tablet, Rfl: 0    predniSONE (Deltasone) 5 mg tablet, Take 3 tablets (15 mg) by mouth once daily for 2 days, THEN 2 tablets (10 mg) once daily for 2 days, THEN 1 tablet (5 mg) once daily for 4 days., Disp: 14 tablet, Rfl: 0      Objective   Physical Examination:    Visit Vitals  /85   Pulse 93   Temp 36.3 °C (97.4 °F)   Resp 18   Wt 86.3 kg (190 lb 3.2 oz)   BMI 25.80 kg/m²   Smoking  Status Former   BSA 2.09 m²        Gen: NAD, A&O x 3  HEENT: clear sclera and conjunctiva,     Musculoskeletal:   Neck; WNL, full ROM  Shoulder: WNL, full ROM  Elbow:WNL, full ROM, no effusion noted  Wrist and fingers;no active synovitis noted, Full ROM in the Wrist , Good fist and   Knees:  No effusions or crepitation, full ROM.  Hips; WNL, full ROM, Negative Jae test  Ankle, Feet; WNL, full ROM    Skin: No rashes or lesions seen, no nail changes  Neuro: A&O x3, Normal Gait    Procedures :None    Orders:  Orders Placed This Encounter   Procedures    CBC and Auto Differential    Comprehensive Metabolic Panel    C-Reactive Protein    Sedimentation Rate        Provider Impression:   Assessment/Plan   Encounter Diagnosis   Name Primary?    Seropositive rheumatoid arthritis (Multi) Yes     72 y/o male with Seropositive RA present for evaluation.  It appears among a number of things is also dealing with rheumatoid arthritis flareup  -Start prednisone taper  -Continue methotrexate, again told to  increase to 6 pills weekly with daily folic acid   -Continue Plaquenil 200 mg BID, follow-up with ophthalmology for yearly eye screening  -Conitnue Celebrex 200 mg daily as needed.  -Can use diclofenac gel for his fingers as needed  -Encourage patient to follow-up with PCP for depression and thyroid management  -We will obtain routine labs  - F/u in 4 months

## 2024-08-13 LAB — ERYTHROCYTE [SEDIMENTATION RATE] IN BLOOD BY WESTERGREN METHOD: 21 MM/H (ref 0–20)

## 2024-10-13 DIAGNOSIS — M05.9 SEROPOSITIVE RHEUMATOID ARTHRITIS: ICD-10-CM

## 2024-10-17 ENCOUNTER — APPOINTMENT (OUTPATIENT)
Dept: RHEUMATOLOGY | Facility: CLINIC | Age: 74
End: 2024-10-17
Payer: MEDICARE

## 2024-10-17 ENCOUNTER — LAB (OUTPATIENT)
Dept: LAB | Facility: LAB | Age: 74
End: 2024-10-17
Payer: MEDICARE

## 2024-10-17 VITALS
HEART RATE: 92 BPM | WEIGHT: 188 LBS | DIASTOLIC BLOOD PRESSURE: 77 MMHG | SYSTOLIC BLOOD PRESSURE: 157 MMHG | TEMPERATURE: 97.7 F | BODY MASS INDEX: 25.5 KG/M2 | OXYGEN SATURATION: 97 %

## 2024-10-17 DIAGNOSIS — M15.9 OSTEOARTHRITIS OF MULTIPLE JOINTS, UNSPECIFIED OSTEOARTHRITIS TYPE: ICD-10-CM

## 2024-10-17 DIAGNOSIS — M05.9 SEROPOSITIVE RHEUMATOID ARTHRITIS: ICD-10-CM

## 2024-10-17 DIAGNOSIS — M05.9 SEROPOSITIVE RHEUMATOID ARTHRITIS: Primary | ICD-10-CM

## 2024-10-17 LAB
ALBUMIN SERPL BCP-MCNC: 4 G/DL (ref 3.4–5)
ALP SERPL-CCNC: 81 U/L (ref 33–136)
ALT SERPL W P-5'-P-CCNC: 10 U/L (ref 10–52)
AST SERPL W P-5'-P-CCNC: 16 U/L (ref 9–39)
BILIRUB DIRECT SERPL-MCNC: 0.1 MG/DL (ref 0–0.3)
BILIRUB SERPL-MCNC: 0.4 MG/DL (ref 0–1.2)
CRP SERPL-MCNC: 1.01 MG/DL
ERYTHROCYTE [SEDIMENTATION RATE] IN BLOOD BY WESTERGREN METHOD: 8 MM/H (ref 0–20)
PROT SERPL-MCNC: 6.7 G/DL (ref 6.4–8.2)

## 2024-10-17 PROCEDURE — 36415 COLL VENOUS BLD VENIPUNCTURE: CPT

## 2024-10-17 PROCEDURE — 1159F MED LIST DOCD IN RCRD: CPT | Performed by: INTERNAL MEDICINE

## 2024-10-17 PROCEDURE — 86140 C-REACTIVE PROTEIN: CPT

## 2024-10-17 PROCEDURE — 1036F TOBACCO NON-USER: CPT | Performed by: INTERNAL MEDICINE

## 2024-10-17 PROCEDURE — 1160F RVW MEDS BY RX/DR IN RCRD: CPT | Performed by: INTERNAL MEDICINE

## 2024-10-17 PROCEDURE — 85652 RBC SED RATE AUTOMATED: CPT

## 2024-10-17 PROCEDURE — 1125F AMNT PAIN NOTED PAIN PRSNT: CPT | Performed by: INTERNAL MEDICINE

## 2024-10-17 PROCEDURE — 99214 OFFICE O/P EST MOD 30 MIN: CPT | Performed by: INTERNAL MEDICINE

## 2024-10-17 PROCEDURE — 96372 THER/PROPH/DIAG INJ SC/IM: CPT | Performed by: INTERNAL MEDICINE

## 2024-10-17 PROCEDURE — 80076 HEPATIC FUNCTION PANEL: CPT

## 2024-10-17 RX ORDER — LIDOCAINE HYDROCHLORIDE 10 MG/ML
1 INJECTION, SOLUTION INFILTRATION; PERINEURAL ONCE
Status: COMPLETED | OUTPATIENT
Start: 2024-10-17 | End: 2024-10-17

## 2024-10-17 RX ORDER — HYDROXYCHLOROQUINE SULFATE 200 MG/1
1.5 TABLET, FILM COATED ORAL DAILY
Qty: 135 TABLET | Refills: 3 | OUTPATIENT
Start: 2024-10-17

## 2024-10-17 RX ORDER — HYDROXYCHLOROQUINE SULFATE 200 MG/1
300 TABLET, FILM COATED ORAL DAILY
Status: SHIPPED
Start: 2024-10-17

## 2024-10-17 RX ORDER — TRIAMCINOLONE ACETONIDE 40 MG/ML
20 INJECTION, SUSPENSION INTRA-ARTICULAR; INTRAMUSCULAR ONCE
Status: COMPLETED | OUTPATIENT
Start: 2024-10-17 | End: 2024-10-17

## 2024-10-17 ASSESSMENT — PAIN SCALES - GENERAL: PAINLEVEL_OUTOF10: 2

## 2024-10-17 NOTE — PROGRESS NOTES
Follow-up Rheumatology Patient Visit    Chief Complaint:  Charles C Shoenberger is a 74 y.o. male presenting today for Follow-up (fuv).    History of Presenting Problem:   73 y/o male with RA present for evaluation. He is currently on MTX 5 pills weekly with folic acid  mg daily He weaned of chronic steroids and is now doing much better mood wise per patient..  Hx of COVID December 2020.   Today he he has good days and bad days and is arthritis sometimes he has occasional joint stiffness in his fingers, DIP and MCP joints.  But otherwise he is able to do most of his activities and is currently swimming.  He takes ibuprofen as needed  Today he report he is feeling better than the last visit. He does have some stiffness in his hands, neck and occasional in the knees.  He reports morning stiffness of 15 to 20 minutes  He reports depression issues last visit has resolved  He recently had a skin biopsy on his upper abdomen, he has not healed well yet after more than 2 month.    Problem List:   Patient Active Problem List   Diagnosis    Arthropathy of hand    Vitamin D deficiency    Seropositive rheumatoid arthritis    Raynauds phenomenon    Radiculopathy    Premature ventricular contractions    Pain of right scapula    Mitral valve prolapse syndrome    Mechanical low back pain    Joint pain, hip    IBS (irritable bowel syndrome)    Biceps tendinitis of right upper extremity       Past Medical History:   Past Medical History:   Diagnosis Date    Bicipital tendinitis, right shoulder 05/26/2020    Biceps tendinitis of right upper extremity    Bunion of right foot 10/04/2016    Bunion of great toe of right foot    Calculus of kidney     Calcium kidney stones    Personal history of other diseases of the circulatory system 05/01/2019    History of cardiomyopathy    Personal history of other diseases of the musculoskeletal system and connective tissue 03/04/2014    History of joint pain    Personal history of other  diseases of the nervous system and sense organs 12/02/2015    History of carpal tunnel syndrome    Personal history of other diseases of the respiratory system 03/17/2017    History of acute bronchitis    Personal history of other diseases of the respiratory system 02/19/2020    History of acute sinusitis    Personal history of other diseases of the respiratory system 11/03/2015    History of acute sinusitis    Personal history of other diseases of the respiratory system 11/03/2015    History of acute bronchitis    Pneumonia, unspecified organism 03/27/2019    Community acquired pneumonia    Radiculopathy, cervical region 03/03/2020    Cervical radiculopathy    Right lower quadrant pain 03/13/2014    Abdominal pain, RLQ (right lower quadrant)    Sensorineural hearing loss, bilateral 02/07/2017    Bilateral sensorineural hearing loss    Strain of muscle, fascia and tendon of lower back, initial encounter 06/18/2019    Back strain, initial encounter       Surgical History:   Past Surgical History:   Procedure Laterality Date    APPENDECTOMY  09/03/2013    Appendectomy    COLONOSCOPY  02/07/2017    Complete Colonoscopy    HERNIA REPAIR  09/03/2013    Inguinal Hernia Repair        Allergies:   Allergies   Allergen Reactions    Lisinopril Cough       Medications:   Current Outpatient Medications:     acetaminophen (Tylenol Extra Strength) 500 mg tablet, Take 1 tablet (500 mg) by mouth every 4 hours if needed., Disp: , Rfl:     acyclovir (Zovirax) 200 mg capsule, Take 1 capsule (200 mg) by mouth every 12 hours., Disp: , Rfl:     celecoxib (CeleBREX) 200 mg capsule, Take 1 capsule (200 mg) by mouth once daily as needed for mild pain (1 - 3)., Disp: 90 capsule, Rfl: 0    cyclobenzaprine (Flexeril) 10 mg tablet, Take 1 tablet (10 mg) by mouth 3 times a day as needed., Disp: , Rfl:     desoximetasone (Topicort) 0.25 % cream, APPLY TWICE DAILY AS NEEDED TO AFFECTED AREA, Disp: , Rfl:     diclofenac sodium 1 % kit, Place 2 g on  the skin 3 times a day., Disp: , Rfl:     folic acid (Folvite) 1 mg tablet, Take 1 tablet (1 mg) by mouth once daily., Disp: , Rfl:     hydroxychloroquine (Plaquenil) 200 mg tablet, Take 1.5 tablets (300 mg) by mouth once daily., Disp: , Rfl:     ketoconazole (NIZOral) 2 % cream, , Disp: , Rfl:     levothyroxine (Synthroid, Levoxyl) 25 mcg tablet, Take 1 tablet (25 mcg) by mouth once daily., Disp: , Rfl:     lidocaine (Lidoderm) 5 % patch, USE AS DIRECTED ON PACKAGE, Disp: , Rfl:     methotrexate (Trexall) 2.5 mg tablet, Take 6 tablets (15 mg total) by mouth 1 (one) time per week.  Take 3 pills in the am and then 3 pills pm on the same day. Follow directions carefully, and ask to explain any part you do not understand. Take exactly as directed., Disp: 112 tablet, Rfl: 0    mv-min-folic-lycop-lut-herb178 (Nate Multivitamin For Men) 200-175-250 mcg tablet, Take 1 tablet by mouth once daily., Disp: , Rfl:     sildenafil (Viagra) 50 mg tablet, Take by mouth., Disp: , Rfl:     tamsulosin (Flomax) 0.4 mg 24 hr capsule, Take by mouth once daily., Disp: , Rfl:     triamcinolone (Kenalog) 0.1 % cream, APPLY TOPICALLY TO AFFECTED AREAS ON FINGER AND LEFT WRIST ONCE DAILY AND COVER WITH PAPER TAPE, Disp: , Rfl:       Objective   Physical Examination:    Visit Vitals  /77 (BP Location: Left arm, Patient Position: Sitting, BP Cuff Size: Adult)   Pulse 92   Temp 36.5 °C (97.7 °F) (Temporal)   Wt 85.3 kg (188 lb)   SpO2 97%   BMI 25.50 kg/m²   Smoking Status Former   BSA 2.08 m²        Gen: NAD, A&O x 3  HEENT: clear sclera and conjunctiva,     Musculoskeletal:   Neck; WNL, full ROM  Shoulder: WNL, full ROM  Elbow:WNL, full ROM, no effusion noted  Wrist and fingers;no active synovitis noted, Full ROM in the Wrist , Good fist and   Knees:  No effusions or crepitation, full ROM.  Hips; WNL, full ROM, Negative Jae test  Ankle, Feet; WNL, full ROM    Skin: No rashes or lesions seen, no nail changes  Neuro: A&O x3, Normal  Gait    Procedures :None    Orders:  No orders of the defined types were placed in this encounter.       Provider Impression:   Assessment/Plan   Encounter Diagnosis   Name Primary?    Seropositive rheumatoid arthritis Yes       73 y/o male with Seropositive RA present for evaluation.  It appears among a number of things is also dealing with rheumatoid arthritis flareup  -Continue methotrexate 6 pills weekly with daily folic acid   -Continue Plaquenil 300 mg DAILY follow-up with ophthalmology for yearly eye screening  -Conitnue Celebrex 200 mg daily as needed.  -Patient consented and tolerated IM kenalog  -Can use diclofenac gel for his fingers as needed  -Encourage patient to follow-up with PCP for depression and thyroid management  -rReviewed recent labs  - F/u in 3 months

## 2024-10-21 RX ORDER — CELECOXIB 200 MG/1
CAPSULE ORAL
Qty: 90 CAPSULE | Refills: 3 | Status: SHIPPED | OUTPATIENT
Start: 2024-10-21

## 2024-11-04 ENCOUNTER — TELEPHONE (OUTPATIENT)
Dept: RHEUMATOLOGY | Facility: CLINIC | Age: 74
End: 2024-11-04
Payer: MEDICARE

## 2024-11-04 DIAGNOSIS — M05.9 SEROPOSITIVE RHEUMATOID ARTHRITIS: ICD-10-CM

## 2024-11-04 RX ORDER — HYDROXYCHLOROQUINE SULFATE 200 MG/1
300 TABLET, FILM COATED ORAL DAILY
Status: CANCELLED | OUTPATIENT
Start: 2024-11-04

## 2024-11-04 RX ORDER — HYDROXYCHLOROQUINE SULFATE 200 MG/1
300 TABLET, FILM COATED ORAL DAILY
Qty: 135 TABLET | Refills: 0 | Status: SHIPPED | OUTPATIENT
Start: 2024-11-04 | End: 2025-02-02

## 2025-01-16 ENCOUNTER — APPOINTMENT (OUTPATIENT)
Dept: RHEUMATOLOGY | Facility: CLINIC | Age: 75
End: 2025-01-16
Payer: MEDICARE

## 2025-01-16 VITALS
WEIGHT: 188 LBS | BODY MASS INDEX: 25.47 KG/M2 | HEIGHT: 72 IN | SYSTOLIC BLOOD PRESSURE: 139 MMHG | HEART RATE: 99 BPM | DIASTOLIC BLOOD PRESSURE: 77 MMHG | OXYGEN SATURATION: 97 % | TEMPERATURE: 97.5 F

## 2025-01-16 DIAGNOSIS — Z79.899 IMMUNOCOMPROMISED STATE DUE TO DRUG THERAPY: ICD-10-CM

## 2025-01-16 DIAGNOSIS — M15.9 OSTEOARTHRITIS OF MULTIPLE JOINTS, UNSPECIFIED OSTEOARTHRITIS TYPE: ICD-10-CM

## 2025-01-16 DIAGNOSIS — D84.821 IMMUNOCOMPROMISED STATE DUE TO DRUG THERAPY: ICD-10-CM

## 2025-01-16 DIAGNOSIS — M05.9 SEROPOSITIVE RHEUMATOID ARTHRITIS: Primary | ICD-10-CM

## 2025-01-16 PROCEDURE — 1160F RVW MEDS BY RX/DR IN RCRD: CPT | Performed by: INTERNAL MEDICINE

## 2025-01-16 PROCEDURE — 1036F TOBACCO NON-USER: CPT | Performed by: INTERNAL MEDICINE

## 2025-01-16 PROCEDURE — 99214 OFFICE O/P EST MOD 30 MIN: CPT | Performed by: INTERNAL MEDICINE

## 2025-01-16 PROCEDURE — 96372 THER/PROPH/DIAG INJ SC/IM: CPT | Performed by: INTERNAL MEDICINE

## 2025-01-16 PROCEDURE — 3008F BODY MASS INDEX DOCD: CPT | Performed by: INTERNAL MEDICINE

## 2025-01-16 PROCEDURE — 1159F MED LIST DOCD IN RCRD: CPT | Performed by: INTERNAL MEDICINE

## 2025-01-16 RX ORDER — HYDROXYCHLOROQUINE SULFATE 200 MG/1
300 TABLET, FILM COATED ORAL DAILY
Qty: 135 TABLET | Refills: 1 | Status: SHIPPED | OUTPATIENT
Start: 2025-01-16 | End: 2025-04-16

## 2025-01-16 RX ORDER — METHOTREXATE 2.5 MG/1
15 TABLET ORAL
Qty: 96 TABLET | Refills: 0 | Status: SHIPPED | OUTPATIENT
Start: 2025-01-19

## 2025-01-16 RX ORDER — LIDOCAINE HYDROCHLORIDE 10 MG/ML
1 INJECTION, SOLUTION INFILTRATION; PERINEURAL ONCE
Status: COMPLETED | OUTPATIENT
Start: 2025-01-16 | End: 2025-01-16

## 2025-01-16 RX ORDER — TRIAMCINOLONE ACETONIDE 40 MG/ML
20 INJECTION, SUSPENSION INTRA-ARTICULAR; INTRAMUSCULAR ONCE
Status: COMPLETED | OUTPATIENT
Start: 2025-01-16 | End: 2025-01-16

## 2025-01-16 RX ADMIN — LIDOCAINE HYDROCHLORIDE 1 ML: 10 INJECTION, SOLUTION INFILTRATION; PERINEURAL at 14:57

## 2025-01-16 RX ADMIN — TRIAMCINOLONE ACETONIDE 20 MG: 40 INJECTION, SUSPENSION INTRA-ARTICULAR; INTRAMUSCULAR at 14:52

## 2025-01-16 ASSESSMENT — PATIENT HEALTH QUESTIONNAIRE - PHQ9
SUM OF ALL RESPONSES TO PHQ9 QUESTIONS 1 AND 2: 0
1. LITTLE INTEREST OR PLEASURE IN DOING THINGS: NOT AT ALL
2. FEELING DOWN, DEPRESSED OR HOPELESS: NOT AT ALL

## 2025-01-16 ASSESSMENT — COLUMBIA-SUICIDE SEVERITY RATING SCALE - C-SSRS
1. IN THE PAST MONTH, HAVE YOU WISHED YOU WERE DEAD OR WISHED YOU COULD GO TO SLEEP AND NOT WAKE UP?: NO
2. HAVE YOU ACTUALLY HAD ANY THOUGHTS OF KILLING YOURSELF?: NO
6. HAVE YOU EVER DONE ANYTHING, STARTED TO DO ANYTHING, OR PREPARED TO DO ANYTHING TO END YOUR LIFE?: NO

## 2025-01-16 NOTE — PROGRESS NOTES
Follow-up Rheumatology Patient Visit    Chief Complaint:  Charles C Shoenberger is a 74 y.o. male presenting today for Follow-up (3 Month FUV ).    History of Presenting Problem:   75 y/o male with RA present for evaluation. He is currently on MTX 5 pills weekly with folic acid  mg daily He weaned of chronic steroids and is now doing much better mood wise per patient..  Hx of COVID December 2020.   Today he report he is feeling better than the last visit. He does have some stiffness in his shoulder, neck and occasional in the knees.  He would like IM shot given last visit as he feel it helped.     Problem List:   Patient Active Problem List   Diagnosis    Arthropathy of hand    Vitamin D deficiency    Seropositive rheumatoid arthritis    Raynauds phenomenon    Radiculopathy    Premature ventricular contractions    Pain of right scapula    Mitral valve prolapse syndrome    Mechanical low back pain    Joint pain, hip    IBS (irritable bowel syndrome)    Biceps tendinitis of right upper extremity       Past Medical History:   Past Medical History:   Diagnosis Date    Bicipital tendinitis, right shoulder 05/26/2020    Biceps tendinitis of right upper extremity    Bunion of right foot 10/04/2016    Bunion of great toe of right foot    Calculus of kidney     Calcium kidney stones    Personal history of other diseases of the circulatory system 05/01/2019    History of cardiomyopathy    Personal history of other diseases of the musculoskeletal system and connective tissue 03/04/2014    History of joint pain    Personal history of other diseases of the nervous system and sense organs 12/02/2015    History of carpal tunnel syndrome    Personal history of other diseases of the respiratory system 03/17/2017    History of acute bronchitis    Personal history of other diseases of the respiratory system 02/19/2020    History of acute sinusitis    Personal history of other diseases of the respiratory system 11/03/2015     History of acute sinusitis    Personal history of other diseases of the respiratory system 11/03/2015    History of acute bronchitis    Pneumonia, unspecified organism 03/27/2019    Community acquired pneumonia    Radiculopathy, cervical region 03/03/2020    Cervical radiculopathy    Right lower quadrant pain 03/13/2014    Abdominal pain, RLQ (right lower quadrant)    Sensorineural hearing loss, bilateral 02/07/2017    Bilateral sensorineural hearing loss    Strain of muscle, fascia and tendon of lower back, initial encounter 06/18/2019    Back strain, initial encounter       Surgical History:   Past Surgical History:   Procedure Laterality Date    APPENDECTOMY  09/03/2013    Appendectomy    COLONOSCOPY  02/07/2017    Complete Colonoscopy    HERNIA REPAIR  09/03/2013    Inguinal Hernia Repair        Allergies:   Allergies   Allergen Reactions    Lisinopril Cough       Medications:   Current Outpatient Medications:     acetaminophen (Tylenol Extra Strength) 500 mg tablet, Take 1 tablet (500 mg) by mouth every 4 hours if needed., Disp: , Rfl:     acyclovir (Zovirax) 200 mg capsule, Take 1 capsule (200 mg) by mouth every 12 hours., Disp: , Rfl:     celecoxib (CeleBREX) 200 mg capsule, TAKE 1 CAPSULE BY MOUTH ONCE  DAILY AS NEEDED FOR MILD PAIN (1 TO 3), Disp: 90 capsule, Rfl: 3    cyclobenzaprine (Flexeril) 10 mg tablet, Take 1 tablet (10 mg) by mouth 3 times a day as needed., Disp: , Rfl:     desoximetasone (Topicort) 0.25 % cream, APPLY TWICE DAILY AS NEEDED TO AFFECTED AREA, Disp: , Rfl:     diclofenac sodium 1 % kit, Place 2 g on the skin 3 times a day., Disp: , Rfl:     folic acid (Folvite) 1 mg tablet, Take 1 tablet (1 mg) by mouth once daily., Disp: , Rfl:     ketoconazole (NIZOral) 2 % cream, , Disp: , Rfl:     levothyroxine (Synthroid, Levoxyl) 25 mcg tablet, Take 1 tablet (25 mcg) by mouth once daily., Disp: , Rfl:     mv-min-folic-lycop-lut-herb178 (Nate Multivitamin For Men) 200-175-250 mcg tablet, Take 1  tablet by mouth once daily., Disp: , Rfl:     sildenafil (Viagra) 50 mg tablet, Take by mouth., Disp: , Rfl:     triamcinolone (Kenalog) 0.1 % cream, APPLY TOPICALLY TO AFFECTED AREAS ON FINGER AND LEFT WRIST ONCE DAILY AND COVER WITH PAPER TAPE, Disp: , Rfl:     hydroxychloroquine (Plaquenil) 200 mg tablet, Take 1.5 tablets (300 mg) by mouth once daily., Disp: 135 tablet, Rfl: 1    lidocaine (Lidoderm) 5 % patch, USE AS DIRECTED ON PACKAGE (Patient not taking: Reported on 1/16/2025), Disp: , Rfl:     [START ON 1/19/2025] methotrexate (Trexall) 2.5 mg tablet, Take 6 tablets (15 mg total) by mouth 1 (one) time per week.  Take 3 pills in the am and then 3 pills pm on the same day. Follow directions carefully, and ask to explain any part you do not understand. Take exactly as directed., Disp: 96 tablet, Rfl: 0    tamsulosin (Flomax) 0.4 mg 24 hr capsule, Take by mouth once daily. (Patient not taking: Reported on 1/16/2025), Disp: , Rfl:       Objective   Physical Examination:    Visit Vitals  /77 (BP Location: Left arm, Patient Position: Sitting, BP Cuff Size: Adult long)   Pulse 99   Temp 36.4 °C (97.5 °F) (Temporal)   Ht 1.829 m (6')   Wt 85.3 kg (188 lb)   SpO2 97%   BMI 25.50 kg/m²   Smoking Status Former   BSA 2.08 m²        Gen: NAD, A&O x 3  HEENT: clear sclera and conjunctiva,     Musculoskeletal:   Neck; WNL, full ROM  Shoulder: WNL, full ROM  Elbow:WNL, full ROM, no effusion noted  Wrist and fingers;no active synovitis noted, Full ROM in the Wrist , Good fist and   Knees:  No effusions or crepitation, full ROM.  Hips; WNL, full ROM, Negative Jae test  Ankle, Feet; WNL, full ROM    Skin: No rashes or lesions seen, no nail changes  Neuro: A&O x3, Normal Gait    Procedures :None    Orders:  Orders Placed This Encounter   Procedures    CBC and Auto Differential    C-Reactive Protein    Sedimentation Rate    Hepatic Function Panel    Creatinine        Provider Impression:   Assessment/Plan    Encounter Diagnoses   Name Primary?    Seropositive rheumatoid arthritis Yes    Osteoarthritis of multiple joints, unspecified osteoarthritis type     Immunocompromised state due to drug therapy        73 y/o male with Seropositive RA present for evaluation.  It appears among a number of things is also dealing with rheumatoid arthritis flareup  -Continue methotrexate 6 pills weekly with daily folic acid   -Continue Plaquenil 300 mg DAILY follow-up with ophthalmology for yearly eye screening  -Conitnue Celebrex 200 mg daily as needed.  -Can use diclofenac gel for his fingers as needed  -Patient consented and tolerated IM kenalog injection of 20 mg  -Obtain monitoring labs  - F/u in 4 months

## 2025-03-31 DIAGNOSIS — M05.9 SEROPOSITIVE RHEUMATOID ARTHRITIS: ICD-10-CM

## 2025-03-31 RX ORDER — CELECOXIB 200 MG/1
200 CAPSULE ORAL DAILY PRN
Qty: 90 CAPSULE | Refills: 0 | Status: SHIPPED | OUTPATIENT
Start: 2025-03-31

## 2025-03-31 NOTE — TELEPHONE ENCOUNTER
Prescription Request        Has The Patient Been Identified By Name And Date Of Birth: Yes    RX Requestor: Pharmacy    Date of Last Refill: 10/21/2024    Date Of Last Office Visit: 01/16/2025    Date Of Future Office Visit: 05/15/2025

## 2025-05-01 DIAGNOSIS — M05.9 SEROPOSITIVE RHEUMATOID ARTHRITIS: ICD-10-CM

## 2025-05-01 RX ORDER — METHOTREXATE 2.5 MG/1
15 TABLET ORAL
Qty: 24 TABLET | Refills: 0 | Status: SHIPPED | OUTPATIENT
Start: 2025-05-04 | End: 2025-06-03

## 2025-05-15 ENCOUNTER — APPOINTMENT (OUTPATIENT)
Dept: RHEUMATOLOGY | Facility: CLINIC | Age: 75
End: 2025-05-15
Payer: COMMERCIAL

## 2025-05-15 VITALS
BODY MASS INDEX: 23.69 KG/M2 | HEART RATE: 74 BPM | DIASTOLIC BLOOD PRESSURE: 89 MMHG | OXYGEN SATURATION: 95 % | HEIGHT: 72 IN | WEIGHT: 174.9 LBS | SYSTOLIC BLOOD PRESSURE: 150 MMHG

## 2025-05-15 DIAGNOSIS — M05.9 SEROPOSITIVE RHEUMATOID ARTHRITIS: ICD-10-CM

## 2025-05-15 DIAGNOSIS — Z79.631 METHOTREXATE, LONG TERM, CURRENT USE: Primary | ICD-10-CM

## 2025-05-15 DIAGNOSIS — E55.9 VITAMIN D DEFICIENCY: ICD-10-CM

## 2025-05-15 PROCEDURE — 1159F MED LIST DOCD IN RCRD: CPT | Performed by: INTERNAL MEDICINE

## 2025-05-15 PROCEDURE — 1125F AMNT PAIN NOTED PAIN PRSNT: CPT | Performed by: INTERNAL MEDICINE

## 2025-05-15 PROCEDURE — 1160F RVW MEDS BY RX/DR IN RCRD: CPT | Performed by: INTERNAL MEDICINE

## 2025-05-15 PROCEDURE — 1036F TOBACCO NON-USER: CPT | Performed by: INTERNAL MEDICINE

## 2025-05-15 PROCEDURE — 99214 OFFICE O/P EST MOD 30 MIN: CPT | Performed by: INTERNAL MEDICINE

## 2025-05-15 PROCEDURE — 3008F BODY MASS INDEX DOCD: CPT | Performed by: INTERNAL MEDICINE

## 2025-05-15 RX ORDER — PREDNISONE 5 MG/1
TABLET ORAL
Qty: 31 TABLET | Refills: 0 | Status: SHIPPED | OUTPATIENT
Start: 2025-05-15 | End: 2025-05-29

## 2025-05-15 RX ORDER — HYDROXYCHLOROQUINE SULFATE 200 MG/1
200 TABLET, FILM COATED ORAL 2 TIMES DAILY
COMMUNITY
Start: 2025-03-25

## 2025-05-15 RX ORDER — METHOTREXATE 2.5 MG/1
17.5 TABLET ORAL
Qty: 84 TABLET | Refills: 0 | Status: SHIPPED | OUTPATIENT
Start: 2025-05-18 | End: 2025-08-16

## 2025-05-15 RX ORDER — NITROGLYCERIN 0.4 MG/1
0.4 TABLET SUBLINGUAL EVERY 5 MIN PRN
COMMUNITY
Start: 2025-03-13

## 2025-05-15 RX ORDER — METOPROLOL SUCCINATE 25 MG/1
25 TABLET, EXTENDED RELEASE ORAL
COMMUNITY
Start: 2025-04-28 | End: 2025-07-27

## 2025-05-15 RX ORDER — ATORVASTATIN CALCIUM 40 MG/1
40 TABLET, FILM COATED ORAL
COMMUNITY
Start: 2025-04-28 | End: 2025-07-27

## 2025-05-15 ASSESSMENT — PAIN SCALES - GENERAL: PAINLEVEL_OUTOF10: 2

## 2025-05-15 NOTE — PROGRESS NOTES
Follow-up Rheumatology Patient Visit    Chief Complaint:  Charles C Shoenberger is a 74 y.o. male presenting today for Follow-up.    History of Presenting Problem:   73 y/o male with RA present for evaluation. He is currently on MTX 6 pills weekly with folic acid  mg daily He weaned of chronic steroids and is now doing much better mood wise per patient..  Hx of COVID December 2020.   Today he report  he had a bad day yesterday, but today his joint is better. He report he has been have bad flares prior to the visit. Worse joint is his neck and his knee. He also report swelling in his feet/toes. He does try to take celebrex when he remember it, he also try to interuse with naprosen and ibuprofen.  He report prolonged morning stiffness for more 30 mins.  He report he is losing weight and has poor appetite for the last 1 month. He report fatigue with walking his Dog  Patient report he has been have Cardiac trouble (he had a nuclear stress test which was abnormal March 2025 at [facility]) and is going to have Heart cath 5/21/25.  He was started on cardiac meds which he said seem to help some of his cardiac symptoms.    Problem List:   Patient Active Problem List   Diagnosis    Arthropathy of hand    Vitamin D deficiency    Seropositive rheumatoid arthritis    Raynauds phenomenon    Radiculopathy    Premature ventricular contractions    Pain of right scapula    Mitral valve prolapse syndrome    Mechanical low back pain    Joint pain, hip    IBS (irritable bowel syndrome)    Biceps tendinitis of right upper extremity       Past Medical History:   Past Medical History:   Diagnosis Date    Bicipital tendinitis, right shoulder 05/26/2020    Biceps tendinitis of right upper extremity    Bunion of right foot 10/04/2016    Bunion of great toe of right foot    Calculus of kidney     Calcium kidney stones    Personal history of other diseases of the circulatory system 05/01/2019    History of cardiomyopathy    Personal history of  other diseases of the musculoskeletal system and connective tissue 03/04/2014    History of joint pain    Personal history of other diseases of the nervous system and sense organs 12/02/2015    History of carpal tunnel syndrome    Personal history of other diseases of the respiratory system 03/17/2017    History of acute bronchitis    Personal history of other diseases of the respiratory system 02/19/2020    History of acute sinusitis    Personal history of other diseases of the respiratory system 11/03/2015    History of acute sinusitis    Personal history of other diseases of the respiratory system 11/03/2015    History of acute bronchitis    Pneumonia, unspecified organism 03/27/2019    Community acquired pneumonia    Radiculopathy, cervical region 03/03/2020    Cervical radiculopathy    Right lower quadrant pain 03/13/2014    Abdominal pain, RLQ (right lower quadrant)    Sensorineural hearing loss, bilateral 02/07/2017    Bilateral sensorineural hearing loss    Strain of muscle, fascia and tendon of lower back, initial encounter 06/18/2019    Back strain, initial encounter       Surgical History:   Past Surgical History:   Procedure Laterality Date    APPENDECTOMY  09/03/2013    Appendectomy    COLONOSCOPY  02/07/2017    Complete Colonoscopy    HERNIA REPAIR  09/03/2013    Inguinal Hernia Repair        Allergies:   Allergies   Allergen Reactions    Lisinopril Cough       Medications:   Current Outpatient Medications:     acetaminophen (Tylenol Extra Strength) 500 mg tablet, Take 1 tablet (500 mg) by mouth every 4 hours if needed., Disp: , Rfl:     acyclovir (Zovirax) 200 mg capsule, Take 1 capsule (200 mg) by mouth every 12 hours., Disp: , Rfl:     atorvastatin (Lipitor) 40 mg tablet, Take 1 tablet (40 mg) by mouth once daily., Disp: , Rfl:     folic acid (Folvite) 1 mg tablet, Take 1 tablet (1 mg) by mouth once daily., Disp: , Rfl:     hydroxychloroquine (Plaquenil) 200 mg tablet, Take 1 tablet (200 mg) by  mouth 2 times a day., Disp: , Rfl:     levothyroxine (Synthroid, Levoxyl) 25 mcg tablet, Take 1 tablet (25 mcg) by mouth once daily., Disp: , Rfl:     metoprolol succinate XL (Toprol-XL) 25 mg 24 hr tablet, Take 1 tablet (25 mg) by mouth once daily., Disp: , Rfl:     mv-min-folic-lycop-lut-herb178 (Nate Multivitamin For Men) 200-175-250 mcg tablet, Take 1 tablet by mouth once daily., Disp: , Rfl:     nitroglycerin (Nitrostat) 0.4 mg SL tablet, Place 1 tablet (0.4 mg) under the tongue every 5 minutes if needed., Disp: , Rfl:     [START ON 5/18/2025] methotrexate (Trexall) 2.5 mg tablet, Take 7 tablets (17.5 mg total) by mouth 1 (one) time per week.  Take 4 pills in the am and then 3 pills pm on the same day. Follow directions carefully, and ask to explain any part you do not understand. Take exactly as directed., Disp: 84 tablet, Rfl: 0    predniSONE (Deltasone) 5 mg tablet, Take 4 tablets (20 mg) by mouth once daily for 4 days, THEN 2 tablets (10 mg) once daily for 5 days, THEN 1 tablet (5 mg) once daily for 5 days., Disp: 31 tablet, Rfl: 0      Objective   Physical Examination:    Visit Vitals  /89   Pulse 74   Ht 1.829 m (6')   Wt 79.3 kg (174 lb 14.4 oz)   SpO2 95%   BMI 23.72 kg/m²   Smoking Status Former   BSA 2.01 m²        Gen: NAD, A&O x 3  HEENT: clear sclera and conjunctiva,     Musculoskeletal:   Neck; WNL, full ROM  Shoulder: WNL, full ROM  Elbow:WNL, full ROM, no effusion noted.  Nodules noted in the bilateral elbow  Wrist and fingers;no active synovitis noted, Full ROM in the Wrist , Good fist and   Knees:  No effusions or crepitation, full ROM.  Hips; WNL, full ROM, Negative Jae test  Ankle, Feet; WNL, full ROM    Skin: No rashes or lesions seen, no nail changes  Neuro: A&O x3, Normal Gait    Procedures :None    Orders:  Orders Placed This Encounter   Procedures    CBC and Auto Differential    C-Reactive Protein    Sedimentation Rate    Hepatic Function Panel    Creatinine    Calcium     Creatine Kinase    Vitamin D 25-Hydroxy,Total (for eval of Vitamin D levels)    Uric Acid        Provider Impression:   Assessment/Plan   Encounter Diagnoses   Name Primary?    Seropositive rheumatoid arthritis     Vitamin D deficiency     Methotrexate, long term, current use Yes         73 y/o male with Seropositive RA present for evaluation.  It appears among a number of things is also dealing with rheumatoid arthritis flareup, he has developed nodule on the elbows and noted one on the finger  -Start prednisone taper, starting at 20 mg, taper over 15 days   -Increase methotrexate to 7 pills weekly with daily folic acid   -Continue Plaquenil 300 mg DAILY follow-up with ophthalmology for yearly eye screening  -Conitnue Celebrex 200 mg daily as needed.  -Can use diclofenac gel for his fingers as needed  -Obtain monitoring labs  -encourage patient to go the the Er if he has any more chest pain or chest  discomfort  - F/u in 4 months

## 2025-05-16 LAB
25(OH)D3+25(OH)D2 SERPL-MCNC: 38 NG/ML (ref 30–100)
ALBUMIN SERPL-MCNC: 3.8 G/DL (ref 3.6–5.1)
ALBUMIN/GLOB SERPL: 1.1 (CALC) (ref 1–2.5)
ALP SERPL-CCNC: 85 U/L (ref 35–144)
ALT SERPL-CCNC: 11 U/L (ref 9–46)
AST SERPL-CCNC: 16 U/L (ref 10–35)
BASOPHILS # BLD AUTO: 43 CELLS/UL (ref 0–200)
BASOPHILS NFR BLD AUTO: 0.3 %
BILIRUB DIRECT SERPL-MCNC: 0.1 MG/DL
BILIRUB INDIRECT SERPL-MCNC: 0.4 MG/DL (CALC) (ref 0.2–1.2)
BILIRUB SERPL-MCNC: 0.5 MG/DL (ref 0.2–1.2)
CALCIUM SERPL-MCNC: 9.5 MG/DL (ref 8.6–10.3)
CK SERPL-CCNC: 30 U/L (ref 19–278)
CREAT SERPL-MCNC: 0.85 MG/DL (ref 0.7–1.28)
CRP SERPL-MCNC: 41 MG/L
EGFRCR SERPLBLD CKD-EPI 2021: 92 ML/MIN/1.73M2
EOSINOPHIL # BLD AUTO: 389 CELLS/UL (ref 15–500)
EOSINOPHIL NFR BLD AUTO: 2.7 %
ERYTHROCYTE [DISTWIDTH] IN BLOOD BY AUTOMATED COUNT: 13 % (ref 11–15)
ERYTHROCYTE [SEDIMENTATION RATE] IN BLOOD BY WESTERGREN METHOD: 39 MM/H
GLOBULIN SER CALC-MCNC: 3.4 G/DL (CALC) (ref 1.9–3.7)
HCT VFR BLD AUTO: 44.5 % (ref 38.5–50)
HGB BLD-MCNC: 14.4 G/DL (ref 13.2–17.1)
LYMPHOCYTES # BLD AUTO: 1886 CELLS/UL (ref 850–3900)
LYMPHOCYTES NFR BLD AUTO: 13.1 %
MCH RBC QN AUTO: 29.3 PG (ref 27–33)
MCHC RBC AUTO-ENTMCNC: 32.4 G/DL (ref 32–36)
MCV RBC AUTO: 90.6 FL (ref 80–100)
MONOCYTES # BLD AUTO: 403 CELLS/UL (ref 200–950)
MONOCYTES NFR BLD AUTO: 2.8 %
NEUTROPHILS # BLD AUTO: ABNORMAL CELLS/UL (ref 1500–7800)
NEUTROPHILS NFR BLD AUTO: 81.1 %
PLATELET # BLD AUTO: 428 THOUSAND/UL (ref 140–400)
PMV BLD REES-ECKER: 9.4 FL (ref 7.5–12.5)
PROT SERPL-MCNC: 7.2 G/DL (ref 6.1–8.1)
RBC # BLD AUTO: 4.91 MILLION/UL (ref 4.2–5.8)
URATE SERPL-MCNC: 5.1 MG/DL (ref 4–8)
WBC # BLD AUTO: 14.4 THOUSAND/UL (ref 3.8–10.8)

## 2025-05-19 ENCOUNTER — TELEPHONE (OUTPATIENT)
Dept: RHEUMATOLOGY | Facility: CLINIC | Age: 75
End: 2025-05-19
Payer: COMMERCIAL

## 2025-06-23 DIAGNOSIS — M05.9 SEROPOSITIVE RHEUMATOID ARTHRITIS: Primary | ICD-10-CM

## 2025-06-23 RX ORDER — HYDROXYCHLOROQUINE SULFATE 200 MG/1
300 TABLET, FILM COATED ORAL 2 TIMES DAILY
Qty: 90 TABLET | Refills: 0 | Status: SHIPPED | OUTPATIENT
Start: 2025-06-23 | End: 2025-07-23

## 2025-07-17 ENCOUNTER — APPOINTMENT (OUTPATIENT)
Dept: RHEUMATOLOGY | Facility: CLINIC | Age: 75
End: 2025-07-17
Payer: COMMERCIAL

## 2025-07-17 VITALS
DIASTOLIC BLOOD PRESSURE: 76 MMHG | HEART RATE: 73 BPM | SYSTOLIC BLOOD PRESSURE: 142 MMHG | OXYGEN SATURATION: 99 % | WEIGHT: 175.12 LBS | BODY MASS INDEX: 23.75 KG/M2

## 2025-07-17 DIAGNOSIS — M05.9 SEROPOSITIVE RHEUMATOID ARTHRITIS: Primary | ICD-10-CM

## 2025-07-17 DIAGNOSIS — M15.9 OSTEOARTHRITIS OF MULTIPLE JOINTS, UNSPECIFIED OSTEOARTHRITIS TYPE: ICD-10-CM

## 2025-07-17 DIAGNOSIS — Z79.631 METHOTREXATE, LONG TERM, CURRENT USE: ICD-10-CM

## 2025-07-17 PROCEDURE — 1159F MED LIST DOCD IN RCRD: CPT | Performed by: INTERNAL MEDICINE

## 2025-07-17 PROCEDURE — 1160F RVW MEDS BY RX/DR IN RCRD: CPT | Performed by: INTERNAL MEDICINE

## 2025-07-17 PROCEDURE — 99214 OFFICE O/P EST MOD 30 MIN: CPT | Performed by: INTERNAL MEDICINE

## 2025-07-17 NOTE — PROGRESS NOTES
Follow-up Rheumatology Patient Visit    Chief Complaint:  Charles C Shoenberger is a 74 y.o. male presenting today for Follow-up (2 mo).    History of Presenting Problem:   73 y/o male with RA present for evaluation. He is currently on MTX 6 pills weekly with folic acid  mg daily He weaned of chronic steroids and is now doing much better mood wise per patient..  Hx of COVID December 2020.   Today he report since after his Heart cath s/p 3 stent placement.   He has been doing really well with his joint. He has not any flare up with his joint. He is working with PT, he is working his on his diet.     Problem List:   Patient Active Problem List   Diagnosis    Arthropathy of hand    Vitamin D deficiency    Seropositive rheumatoid arthritis    Raynauds phenomenon    Radiculopathy    Premature ventricular contractions    Pain of right scapula    Mitral valve prolapse syndrome    Mechanical low back pain    Joint pain, hip    IBS (irritable bowel syndrome)    Biceps tendinitis of right upper extremity       Past Medical History:   Past Medical History:   Diagnosis Date    Bicipital tendinitis, right shoulder 05/26/2020    Biceps tendinitis of right upper extremity    Bunion of right foot 10/04/2016    Bunion of great toe of right foot    Calculus of kidney     Calcium kidney stones    Personal history of other diseases of the circulatory system 05/01/2019    History of cardiomyopathy    Personal history of other diseases of the musculoskeletal system and connective tissue 03/04/2014    History of joint pain    Personal history of other diseases of the nervous system and sense organs 12/02/2015    History of carpal tunnel syndrome    Personal history of other diseases of the respiratory system 03/17/2017    History of acute bronchitis    Personal history of other diseases of the respiratory system 02/19/2020    History of acute sinusitis    Personal history of other diseases of the respiratory system 11/03/2015     History of acute sinusitis    Personal history of other diseases of the respiratory system 11/03/2015    History of acute bronchitis    Pneumonia, unspecified organism 03/27/2019    Community acquired pneumonia    Radiculopathy, cervical region 03/03/2020    Cervical radiculopathy    Right lower quadrant pain 03/13/2014    Abdominal pain, RLQ (right lower quadrant)    Sensorineural hearing loss, bilateral 02/07/2017    Bilateral sensorineural hearing loss    Strain of muscle, fascia and tendon of lower back, initial encounter 06/18/2019    Back strain, initial encounter       Surgical History:   Past Surgical History:   Procedure Laterality Date    APPENDECTOMY  09/03/2013    Appendectomy    COLONOSCOPY  02/07/2017    Complete Colonoscopy    HERNIA REPAIR  09/03/2013    Inguinal Hernia Repair        Allergies:   Allergies   Allergen Reactions    Lisinopril Cough       Medications:   Current Outpatient Medications:     acetaminophen (Tylenol Extra Strength) 500 mg tablet, Take 1 tablet (500 mg) by mouth every 4 hours if needed., Disp: , Rfl:     acyclovir (Zovirax) 200 mg capsule, Take 1 capsule (200 mg) by mouth every 12 hours., Disp: , Rfl:     atorvastatin (Lipitor) 40 mg tablet, Take 1 tablet (40 mg) by mouth once daily., Disp: , Rfl:     folic acid (Folvite) 1 mg tablet, Take 1 tablet (1 mg) by mouth once daily., Disp: , Rfl:     hydroxychloroquine (Plaquenil) 200 mg tablet, Take 1.5 tablets (300 mg) by mouth 2 times a day., Disp: 90 tablet, Rfl: 0    levothyroxine (Synthroid, Levoxyl) 25 mcg tablet, Take 1 tablet (25 mcg) by mouth once daily., Disp: , Rfl:     methotrexate (Trexall) 2.5 mg tablet, Take 7 tablets (17.5 mg total) by mouth 1 (one) time per week.  Take 4 pills in the am and then 3 pills pm on the same day. Follow directions carefully, and ask to explain any part you do not understand. Take exactly as directed., Disp: 84 tablet, Rfl: 0    metoprolol succinate XL (Toprol-XL) 25 mg 24 hr tablet, Take  1 tablet (25 mg) by mouth once daily., Disp: , Rfl:     mv-min-folic-lycop-lut-herb178 (Nate Multivitamin For Men) 200-175-250 mcg tablet, Take 1 tablet by mouth once daily., Disp: , Rfl:     nitroglycerin (Nitrostat) 0.4 mg SL tablet, Place 1 tablet (0.4 mg) under the tongue every 5 minutes if needed., Disp: , Rfl:       Objective   Physical Examination:    Visit Vitals  /76 (BP Location: Left arm, Patient Position: Sitting)   Pulse 73   Wt 79.4 kg (175 lb 1.9 oz)   SpO2 99%   BMI 23.75 kg/m²   Smoking Status Former   BSA 2.01 m²        Gen: NAD, A&O x 3  HEENT: clear sclera and conjunctiva,     Musculoskeletal:   Neck; WNL, full ROM  Shoulder: WNL, full ROM  Elbow:WNL, full ROM, no effusion noted.  Nodules noted in the bilateral elbow  Wrist and fingers;no active synovitis noted, Full ROM in the Wrist , Good fist and   Knees:  No effusions or crepitation, full ROM.  Hips; WNL, full ROM, Negative Jae test  Ankle, Feet; WNL, full ROM    Skin: No rashes or lesions seen, no nail changes  Neuro: A&O x3, Normal Gait    Procedures :None    Orders:  Orders Placed This Encounter   Procedures    Hepatic Function Panel    Sedimentation Rate    C-Reactive Protein        Provider Impression:   Assessment/Plan   Encounter Diagnoses   Name Primary?    Seropositive rheumatoid arthritis Yes    Methotrexate, long term, current use     Osteoarthritis of multiple joints, unspecified osteoarthritis type        75 y/o male with Seropositive RA present for evaluation.  It appears among a number of things is also dealing with rheumatoid arthritis flareup, he has developed nodule on the elbows and noted one on the finger    -Continue with methotrexate to 7 pills weekly with daily folic acid   -Continue Plaquenil 300 mg DAILY follow-up with ophthalmology for yearly eye screening  -Conitnue Celebrex 200 mg daily as needed.  -Can use diclofenac gel for his fingers as needed  -Obtain monitoring labs  - F/u in 4 months

## 2025-08-06 ENCOUNTER — OFFICE VISIT (OUTPATIENT)
Dept: URGENT CARE | Age: 75
End: 2025-08-06
Payer: COMMERCIAL

## 2025-08-06 VITALS
DIASTOLIC BLOOD PRESSURE: 73 MMHG | OXYGEN SATURATION: 98 % | HEART RATE: 79 BPM | SYSTOLIC BLOOD PRESSURE: 115 MMHG | RESPIRATION RATE: 18 BRPM | TEMPERATURE: 97.6 F

## 2025-08-06 DIAGNOSIS — L98.9 SKIN LESION: Primary | ICD-10-CM

## 2025-08-06 PROCEDURE — 99203 OFFICE O/P NEW LOW 30 MIN: CPT | Performed by: PHYSICIAN ASSISTANT

## 2025-08-06 PROCEDURE — 1159F MED LIST DOCD IN RCRD: CPT | Performed by: PHYSICIAN ASSISTANT

## 2025-08-06 RX ORDER — CLOPIDOGREL BISULFATE 75 MG/1
75 TABLET ORAL
COMMUNITY
Start: 2025-05-21

## 2025-08-06 RX ORDER — ASPIRIN 81 MG/1
81 TABLET ORAL
COMMUNITY
Start: 2025-05-21

## 2025-08-06 ASSESSMENT — ENCOUNTER SYMPTOMS
DEPRESSION: 0
OCCASIONAL FEELINGS OF UNSTEADINESS: 0
LOSS OF SENSATION IN FEET: 0

## 2025-08-06 ASSESSMENT — PATIENT HEALTH QUESTIONNAIRE - PHQ9
2. FEELING DOWN, DEPRESSED OR HOPELESS: NOT AT ALL
1. LITTLE INTEREST OR PLEASURE IN DOING THINGS: NOT AT ALL
SUM OF ALL RESPONSES TO PHQ9 QUESTIONS 1 AND 2: 0

## 2025-08-06 NOTE — PROGRESS NOTES
Subjective:  Charles C Shoenberger is a 74 y.o. male in clinic today complaining of a possible callus or wart on his left index finger. He has a hard lesion on the side of his finger. It has been present for 3-4 weeks. It is bothering him. Saw his PCP a few weeks ago and told him to follow up with Derm, which he has not done yet.     Review of Systems:  Pertinent items are noted in HPI.      Allergies:  Lisinopril      Current Medications:  Medications Ordered Prior to Encounter[1]      Past Medical History:  Medical History[2]        Objective:  /73 (BP Location: Right arm, Patient Position: Sitting)   Pulse 79   Temp 36.4 °C (97.6 °F)   Resp 18   SpO2 98%       General Appearance: Alert, cooperative, no distress, appropriate for age  Head: Normocephalic,without obvious abnormality  Eyes: Conjunctiva and cornea clear.  Skin/Hair/Nails: Skin warm, dry. Left index finger with hardened yellow skin lesion.  Neurologic: Alert and oriented x 3, gait steady        ASSESSMENT/ PLAN:    1. Skin lesion            Callus vs corn vs wart vs other  Recommend follow up with dermatology - he is already established and does not need referral      Bhakti Cali PA-C        I have given the patient instructions regarding his diagnosis, expectations, follow up, and return to the ER precautions. Iexplained to the patient that emergent conditions may arise to return to the immediate care or ER for new, worsening or any persistent conditions. I've explained the importance of following up with his doctor- Migel Anaya MD - asinstructed. The patient verbalized understanding of the discharge instructions and plan.         [1]   Current Outpatient Medications on File Prior to Visit   Medication Sig Dispense Refill    acyclovir (Zovirax) 200 mg capsule Take 1 capsule (200 mg) by mouth every 12 hours.      aspirin 81 mg EC tablet Take 81 mg by mouth once daily.      atorvastatin (Lipitor) 40 mg tablet Take 1 tablet (40 mg) by mouth  once daily.      clopidogrel (Plavix) 75 mg tablet Take 75 mg by mouth once daily.      folic acid (Folvite) 1 mg tablet Take 1 tablet (1 mg) by mouth once daily.      levothyroxine (Synthroid, Levoxyl) 25 mcg tablet Take 1 tablet (25 mcg) by mouth once daily.      methotrexate (Trexall) 2.5 mg tablet Take 7 tablets (17.5 mg total) by mouth 1 (one) time per week.  Take 4 pills in the am and then 3 pills pm on the same day. Follow directions carefully, and ask to explain any part you do not understand. Take exactly as directed. 84 tablet 0    metoprolol succinate XL (Toprol-XL) 25 mg 24 hr tablet Take 1 tablet (25 mg) by mouth once daily.      mv-min-folic-lycop-lut-herb178 (Nate Multivitamin For Men) 200-175-250 mcg tablet Take 1 tablet by mouth once daily.      nitroglycerin (Nitrostat) 0.4 mg SL tablet Place 1 tablet (0.4 mg) under the tongue every 5 minutes if needed.      acetaminophen (Tylenol Extra Strength) 500 mg tablet Take 1 tablet (500 mg) by mouth every 4 hours if needed. (Patient not taking: Reported on 8/6/2025)       No current facility-administered medications on file prior to visit.   [2]   Past Medical History:  Diagnosis Date    Bicipital tendinitis, right shoulder 05/26/2020    Biceps tendinitis of right upper extremity    Bunion of right foot 10/04/2016    Bunion of great toe of right foot    Calculus of kidney     Calcium kidney stones    Personal history of other diseases of the circulatory system 05/01/2019    History of cardiomyopathy    Personal history of other diseases of the musculoskeletal system and connective tissue 03/04/2014    History of joint pain    Personal history of other diseases of the nervous system and sense organs 12/02/2015    History of carpal tunnel syndrome    Personal history of other diseases of the respiratory system 03/17/2017    History of acute bronchitis    Personal history of other diseases of the respiratory system 02/19/2020    History of acute sinusitis     Personal history of other diseases of the respiratory system 11/03/2015    History of acute sinusitis    Personal history of other diseases of the respiratory system 11/03/2015    History of acute bronchitis    Pneumonia, unspecified organism 03/27/2019    Community acquired pneumonia    Radiculopathy, cervical region 03/03/2020    Cervical radiculopathy    Right lower quadrant pain 03/13/2014    Abdominal pain, RLQ (right lower quadrant)    Sensorineural hearing loss, bilateral 02/07/2017    Bilateral sensorineural hearing loss    Strain of muscle, fascia and tendon of lower back, initial encounter 06/18/2019    Back strain, initial encounter

## 2025-08-26 DIAGNOSIS — M05.9 SEROPOSITIVE RHEUMATOID ARTHRITIS: ICD-10-CM

## 2025-08-27 RX ORDER — HYDROXYCHLOROQUINE SULFATE 200 MG/1
300 TABLET, FILM COATED ORAL DAILY
Qty: 135 TABLET | Refills: 0 | Status: SHIPPED | OUTPATIENT
Start: 2025-08-27 | End: 2025-11-25

## 2025-08-28 DIAGNOSIS — M05.9 SEROPOSITIVE RHEUMATOID ARTHRITIS: ICD-10-CM

## 2025-08-28 RX ORDER — CELECOXIB 200 MG/1
200 CAPSULE ORAL AS NEEDED
Qty: 30 CAPSULE | Refills: 0 | Status: SHIPPED | OUTPATIENT
Start: 2025-08-28

## 2025-10-16 ENCOUNTER — APPOINTMENT (OUTPATIENT)
Dept: RHEUMATOLOGY | Facility: CLINIC | Age: 75
End: 2025-10-16
Payer: COMMERCIAL